# Patient Record
Sex: MALE | Race: WHITE | NOT HISPANIC OR LATINO | Employment: OTHER | URBAN - METROPOLITAN AREA
[De-identification: names, ages, dates, MRNs, and addresses within clinical notes are randomized per-mention and may not be internally consistent; named-entity substitution may affect disease eponyms.]

---

## 2019-02-28 RX ORDER — MONTELUKAST SODIUM 10 MG/1
TABLET ORAL
Qty: 14 TABLET | Refills: 0 | Status: SHIPPED | OUTPATIENT
Start: 2019-02-28 | End: 2019-10-11

## 2019-10-11 ENCOUNTER — OFFICE VISIT (OUTPATIENT)
Dept: FAMILY MEDICINE | Facility: CLINIC | Age: 84
End: 2019-10-11
Payer: MEDICARE

## 2019-10-11 VITALS
OXYGEN SATURATION: 97 % | DIASTOLIC BLOOD PRESSURE: 76 MMHG | BODY MASS INDEX: 29.28 KG/M2 | WEIGHT: 193.19 LBS | HEART RATE: 64 BPM | HEIGHT: 68 IN | SYSTOLIC BLOOD PRESSURE: 122 MMHG | TEMPERATURE: 97 F | RESPIRATION RATE: 18 BRPM

## 2019-10-11 DIAGNOSIS — I25.10 ATHEROSCLEROSIS OF CORONARY ARTERY, ANGINA PRESENCE UNSPECIFIED, UNSPECIFIED VESSEL OR LESION TYPE, UNSPECIFIED WHETHER NATIVE OR TRANSPLANTED HEART: ICD-10-CM

## 2019-10-11 DIAGNOSIS — N18.30 CHRONIC RENAL INSUFFICIENCY, STAGE III (MODERATE): ICD-10-CM

## 2019-10-11 DIAGNOSIS — N32.81 OVERACTIVE BLADDER: ICD-10-CM

## 2019-10-11 DIAGNOSIS — I10 ESSENTIAL HYPERTENSION: ICD-10-CM

## 2019-10-11 DIAGNOSIS — F41.9 ANXIETY: ICD-10-CM

## 2019-10-11 DIAGNOSIS — E11.638 TYPE 2 DIABETES MELLITUS WITH OTHER ORAL COMPLICATION, WITHOUT LONG-TERM CURRENT USE OF INSULIN: Primary | ICD-10-CM

## 2019-10-11 DIAGNOSIS — N39.41 URGE INCONTINENCE: ICD-10-CM

## 2019-10-11 DIAGNOSIS — F51.01 PRIMARY INSOMNIA: ICD-10-CM

## 2019-10-11 DIAGNOSIS — J44.9 CHRONIC OBSTRUCTIVE PULMONARY DISEASE, UNSPECIFIED COPD TYPE: ICD-10-CM

## 2019-10-11 DIAGNOSIS — N40.0 BENIGN PROSTATIC HYPERPLASIA, UNSPECIFIED WHETHER LOWER URINARY TRACT SYMPTOMS PRESENT: ICD-10-CM

## 2019-10-11 DIAGNOSIS — E78.00 PURE HYPERCHOLESTEROLEMIA: ICD-10-CM

## 2019-10-11 PROBLEM — C61 MALIGNANT TUMOR OF PROSTATE: Status: ACTIVE | Noted: 2017-12-13

## 2019-10-11 PROBLEM — Z86.31 PERSONAL HISTORY OF DIABETIC FOOT ULCER: Status: ACTIVE | Noted: 2019-10-11

## 2019-10-11 PROBLEM — I73.9 INTERMITTENT CLAUDICATION: Status: ACTIVE | Noted: 2019-10-11

## 2019-10-11 PROBLEM — I50.1 LEFT HEART FAILURE: Status: ACTIVE | Noted: 2019-10-11

## 2019-10-11 PROBLEM — E11.9 TYPE 2 DIABETES MELLITUS: Status: ACTIVE | Noted: 2019-10-11

## 2019-10-11 PROBLEM — R06.00 DYSPNEA: Status: ACTIVE | Noted: 2019-10-11

## 2019-10-11 PROBLEM — R97.20 RAISED PROSTATE SPECIFIC ANTIGEN: Status: ACTIVE | Noted: 2019-10-11

## 2019-10-11 PROBLEM — R21 RASH: Status: ACTIVE | Noted: 2019-10-11

## 2019-10-11 PROBLEM — D50.0 ANEMIA DUE TO CHRONIC BLOOD LOSS: Status: ACTIVE | Noted: 2019-10-11

## 2019-10-11 PROBLEM — F32.A DEPRESSIVE DISORDER: Status: ACTIVE | Noted: 2019-10-11

## 2019-10-11 PROBLEM — E78.5 HYPERLIPIDEMIA: Status: ACTIVE | Noted: 2019-10-11

## 2019-10-11 PROBLEM — R01.1 HEART MURMUR: Status: ACTIVE | Noted: 2019-10-11

## 2019-10-11 PROCEDURE — 99214 OFFICE O/P EST MOD 30 MIN: CPT | Mod: S$GLB,,, | Performed by: NURSE PRACTITIONER

## 2019-10-11 PROCEDURE — 99214 PR OFFICE/OUTPT VISIT, EST, LEVL IV, 30-39 MIN: ICD-10-PCS | Mod: S$GLB,,, | Performed by: NURSE PRACTITIONER

## 2019-10-11 RX ORDER — ATORVASTATIN CALCIUM 40 MG/1
40 TABLET, FILM COATED ORAL NIGHTLY
COMMUNITY
End: 2020-02-17

## 2019-10-11 RX ORDER — CARVEDILOL 3.12 MG/1
3.12 TABLET ORAL 2 TIMES DAILY WITH MEALS
COMMUNITY
End: 2022-06-09

## 2019-10-11 RX ORDER — OMEPRAZOLE 40 MG/1
40 CAPSULE, DELAYED RELEASE ORAL DAILY
COMMUNITY
End: 2023-04-28

## 2019-10-11 RX ORDER — TAMSULOSIN HYDROCHLORIDE 0.4 MG/1
0.4 CAPSULE ORAL DAILY
COMMUNITY

## 2019-10-11 RX ORDER — LEVOTHYROXINE SODIUM 125 UG/1
TABLET ORAL DAILY
COMMUNITY
End: 2022-11-18

## 2019-10-11 RX ORDER — DONEPEZIL HYDROCHLORIDE 10 MG/1
10 TABLET, FILM COATED ORAL NIGHTLY
COMMUNITY
End: 2019-10-22 | Stop reason: SDUPTHER

## 2019-10-11 RX ORDER — FUROSEMIDE 40 MG/1
40 TABLET ORAL DAILY
COMMUNITY

## 2019-10-11 RX ORDER — MONTELUKAST SODIUM 10 MG/1
10 TABLET ORAL NIGHTLY
Qty: 90 TABLET | Refills: 3
Start: 2019-10-11 | End: 2021-09-28

## 2019-10-11 RX ORDER — ASPIRIN 81 MG/1
81 TABLET ORAL DAILY
COMMUNITY

## 2019-10-11 RX ORDER — FINASTERIDE 5 MG/1
5 TABLET, FILM COATED ORAL DAILY
COMMUNITY

## 2019-10-11 NOTE — PROGRESS NOTES
Subjective:       Patient ID: Mae Hill Sr. is a 86 y.o. male.    Chief Complaint: routine f/u    HPI   Chronic Disease F/U  Reported by patient.    Anxiety compliant with medications; healthier diet, some weekly exercise    Hyperlipidemia well controlled; compliant with medications; no side effects; He claims that he is eating healthy and gets regular exercise    Hypertension well controlled; compliant with medications; no side effects    Hypothyroidism compliant with medications; no side effects    CKD 3 compliant w/ nephrology appts w/ Dr Jones    Diabetes F/U  Reported by patient.  Review finger sticks: morning fasting glucose 130s  Context: seeing eye doctor regularly; checking feet regularly; taking aspirin daily; not missing doses of medications; no side effects from medications  Associated Symptoms: no dizziness; no sweats; no headaches; no increased thirst; no increased appetite; no increased urination; no blurred vision; no calluses on feet; weight loss (13 lbs); no confusion; numbness of feet    Review of Systems   Constitutional: Negative for activity change, appetite change, chills, fatigue and fever.   HENT: Positive for postnasal drip and rhinorrhea.    Respiratory: Negative for apnea, cough, chest tightness, shortness of breath and wheezing.    Cardiovascular: Negative for chest pain, palpitations and leg swelling.   Gastrointestinal: Negative for abdominal distention, abdominal pain, blood in stool, constipation, diarrhea and vomiting.   Genitourinary: Positive for frequency. Negative for difficulty urinating, dysuria, flank pain and urgency.   Musculoskeletal: Negative for arthralgias, back pain, gait problem, joint swelling and neck pain.   Skin: Negative for color change, pallor and rash.   Neurological: Negative for dizziness, tremors, syncope, weakness, light-headedness and numbness.   Hematological: Negative for adenopathy. Does not bruise/bleed easily.   Psychiatric/Behavioral:  Negative for agitation, confusion and sleep disturbance. The patient is not nervous/anxious.        Objective:      Physical Exam   Constitutional: He is oriented to person, place, and time. Vital signs are normal. He appears well-developed and well-nourished.   HENT:   Head: Normocephalic and atraumatic.   Mouth/Throat: Oropharynx is clear and moist.   Eyes: Pupils are equal, round, and reactive to light. Conjunctivae are normal.   Neck: Trachea normal and normal range of motion. Neck supple. Carotid bruit is not present.   Cardiovascular: Normal rate, regular rhythm and normal heart sounds.   Pulmonary/Chest: Effort normal and breath sounds normal.   Abdominal: Soft. Bowel sounds are normal.   Musculoskeletal: Normal range of motion.   Neurological: He is alert and oriented to person, place, and time.   Skin: Skin is warm, dry and intact.   Psychiatric: He has a normal mood and affect. His speech is normal and behavior is normal. Judgment normal.       Assessment:       1. 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 Anxiety    3. Urge incontinence    4. Overactive bladder    5. Benign prostatic hyperplasia, unspecified whether lower urinary tract symptoms present    6. Primary insomnia    7. Chronic renal insufficiency, stage III (moderate)    8. Essential hypertension    9. Pure hypercholesterolemia    10. Chronic obstructive pulmonary disease, unspecified COPD type    11. Atherosclerosis of coronary artery, angina presence unspecified, unspecified vessel or lesion type, unspecified whether native or transplanted heart        Plan:       PROBLEM LIST     Mae was seen today for medication problem.    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    Overactive bladder  -     mirabegron (MYRBETRIQ) 25 mg Tb24 ER tablet; Take 1 tablet (25 mg total) by mouth once daily.    Benign prostatic hyperplasia, unspecified whether lower urinary tract symptoms present    Primary insomnia    Chronic renal insufficiency, stage III (moderate)    Essential hypertension    Pure hypercholesterolemia    Chronic obstructive pulmonary disease, unspecified COPD type    Atherosclerosis of coronary artery, angina presence unspecified, unspecified vessel or lesion type, unspecified whether native or transplanted heart    Other orders  -     montelukast (SINGULAIR) 10 mg tablet; Take 1 tablet (10 mg total) by mouth every evening.    Thirteen lb weight loss and A1c 5.5%; excellent. Feeling better. BP at goal. Continue current regimen.

## 2019-10-11 NOTE — PATIENT INSTRUCTIONS
Start melatonin 10 mg at bedtime. Take the sublingual tablets that you dissolve under your tongue..

## 2019-10-22 DIAGNOSIS — F03.90 DEMENTIA WITHOUT BEHAVIORAL DISTURBANCE, UNSPECIFIED DEMENTIA TYPE: Primary | ICD-10-CM

## 2019-10-22 RX ORDER — DONEPEZIL HYDROCHLORIDE 10 MG/1
10 TABLET, FILM COATED ORAL NIGHTLY
Qty: 90 TABLET | Refills: 3 | Status: SHIPPED | OUTPATIENT
Start: 2019-10-22 | End: 2019-12-04

## 2019-11-13 ENCOUNTER — TELEPHONE (OUTPATIENT)
Dept: UROLOGY | Facility: CLINIC | Age: 84
End: 2019-11-13

## 2019-11-13 NOTE — TELEPHONE ENCOUNTER
----- Message from Shade Cordova sent at 11/12/2019  4:16 PM CST -----  Contact: Pt  Mr. Hill is wondering about how many times he can have his PSA level checked. Please call them at [phone] and please leave a voicemail.

## 2019-11-15 ENCOUNTER — OFFICE VISIT (OUTPATIENT)
Dept: UROLOGY | Facility: CLINIC | Age: 84
End: 2019-11-15
Payer: MEDICARE

## 2019-11-15 VITALS
WEIGHT: 193 LBS | DIASTOLIC BLOOD PRESSURE: 62 MMHG | SYSTOLIC BLOOD PRESSURE: 110 MMHG | HEIGHT: 68 IN | RESPIRATION RATE: 18 BRPM | HEART RATE: 64 BPM | BODY MASS INDEX: 29.25 KG/M2

## 2019-11-15 DIAGNOSIS — C61 MALIGNANT TUMOR OF PROSTATE: Primary | ICD-10-CM

## 2019-11-15 DIAGNOSIS — N40.0 BENIGN PROSTATIC HYPERPLASIA, UNSPECIFIED WHETHER LOWER URINARY TRACT SYMPTOMS PRESENT: ICD-10-CM

## 2019-11-15 LAB
BILIRUB UR QL STRIP: POSITIVE
GLUCOSE UR QL STRIP: NEGATIVE
KETONES UR QL STRIP: POSITIVE
LEUKOCYTE ESTERASE UR QL STRIP: NEGATIVE
PH, POC UA: 5.5
POC AMORP, URINE: ABNORMAL
POC BACTI, URINE: ABNORMAL
POC BLOOD, URINE: NEGATIVE
POC CASTS, URINE: ABNORMAL
POC CRYST, URINE: ABNORMAL
POC EPITH, URINE: ABNORMAL
POC HCG, URINE: ABNORMAL
POC HYALIN, URINE: ABNORMAL
POC MUCUS, URINE: ABNORMAL
POC NITRATES, URINE: NEGATIVE
POC OTHER, URINE: ABNORMAL
POC RBC, URINE: ABNORMAL
POC RESIDUAL URINE VOLUME: 0 ML (ref 0–100)
POC WBC, URINE: ABNORMAL
PROT UR QL STRIP: POSITIVE
PSA, DIAGNOSTIC: 1.89 NG/ML (ref 0.1–4)
SP GR UR STRIP: 1.02 (ref 1–1.03)
UROBILINOGEN UR STRIP-ACNC: 1 (ref 0.3–2.2)

## 2019-11-15 PROCEDURE — 99213 OFFICE O/P EST LOW 20 MIN: CPT | Mod: 25,S$GLB,, | Performed by: NURSE PRACTITIONER

## 2019-11-15 PROCEDURE — 51798 US URINE CAPACITY MEASURE: CPT | Mod: S$GLB,,, | Performed by: NURSE PRACTITIONER

## 2019-11-15 PROCEDURE — 81001 POCT URINALYSIS (W/MICRO OPTION): ICD-10-PCS | Mod: S$GLB,,, | Performed by: NURSE PRACTITIONER

## 2019-11-15 PROCEDURE — 51798 POCT BLADDER SCAN: ICD-10-PCS | Mod: S$GLB,,, | Performed by: NURSE PRACTITIONER

## 2019-11-15 PROCEDURE — 99213 PR OFFICE/OUTPT VISIT, EST, LEVL III, 20-29 MIN: ICD-10-PCS | Mod: 25,S$GLB,, | Performed by: NURSE PRACTITIONER

## 2019-11-15 PROCEDURE — 81001 URINALYSIS AUTO W/SCOPE: CPT | Mod: S$GLB,,, | Performed by: NURSE PRACTITIONER

## 2019-11-15 NOTE — ASSESSMENT & PLAN NOTE
Doing well on tamsulosin/finasteride, adequate medication on hand with refill.  PSA negative for blood nitrite leukocyte.  PVR 0

## 2019-11-15 NOTE — PROGRESS NOTES
Subjective:       Patient ID: Mae Hill Sr. is a 86 y.o. male.    Chief Complaint: Other (3 mth F/U prostate cacner, and difficulty voiding, He states he has had left lower abdomen pain x 3 days) and Urinary Frequency      HPI: 86-year-old male patient known to Dr. Mcfadden today.  History of cancer of the prostate on observation.  Current PSA 1.08 September 13, 2019.  Patient is off Casodex.  Denies any bone pain unexplained weight loss.  Also has BPH, on finasteride/tamsulosin.  Voiding without difficulty no blood dysuria frequency urgency incontinence.      Past Medical History: History reviewed. No pertinent past medical history.    Past Surgical Historical:   Past Surgical History:   Procedure Laterality Date    HERNIA REPAIR          Medications:   Medication List with Changes/Refills   Current Medications    ASPIRIN (ECOTRIN) 81 MG EC TABLET    Take 81 mg by mouth once daily.    ATORVASTATIN (LIPITOR) 40 MG TABLET    Take 40 mg by mouth every evening.    CARVEDILOL (COREG) 3.125 MG TABLET    Take 3.125 mg by mouth 2 (two) times daily with meals.    DONEPEZIL (ARICEPT) 10 MG TABLET    Take 1 tablet (10 mg total) by mouth every evening.    FINASTERIDE (PROSCAR) 5 MG TABLET    Take 5 mg by mouth once daily.    FUROSEMIDE (LASIX) 40 MG TABLET    Take 40 mg by mouth once daily.    LEVOTHYROXINE (SYNTHROID) 88 MCG TABLET    Take 88 mcg by mouth once daily.    MIRABEGRON (MYRBETRIQ) 25 MG TB24 ER TABLET    Take 1 tablet (25 mg total) by mouth once daily.    MONTELUKAST (SINGULAIR) 10 MG TABLET    Take 1 tablet (10 mg total) by mouth every evening.    OMEPRAZOLE (PRILOSEC) 40 MG CAPSULE    Take 40 mg by mouth once daily.    TAMSULOSIN (FLOMAX) 0.4 MG CAP    Take 0.4 mg by mouth once daily.        Past Social History:   Social History     Socioeconomic History    Marital status:      Spouse name: Not on file    Number of children: Not on file    Years of education: Not on file    Highest education  level: Not on file   Occupational History    Not on file   Social Needs    Financial resource strain: Not on file    Food insecurity:     Worry: Not on file     Inability: Not on file    Transportation needs:     Medical: Not on file     Non-medical: Not on file   Tobacco Use    Smoking status: Never Smoker    Smokeless tobacco: Never Used   Substance and Sexual Activity    Alcohol use: Not Currently    Drug use: Not on file    Sexual activity: Not on file   Lifestyle    Physical activity:     Days per week: Not on file     Minutes per session: Not on file    Stress: Not at all   Relationships    Social connections:     Talks on phone: Not on file     Gets together: Not on file     Attends Gnosticist service: Not on file     Active member of club or organization: Not on file     Attends meetings of clubs or organizations: Not on file     Relationship status: Not on file   Other Topics Concern    Not on file   Social History Narrative    Not on file       Allergies:   Review of patient's allergies indicates:   Allergen Reactions    Codeine Itching and Nausea And Vomiting        Family History:   Family History   Problem Relation Age of Onset    Heart disease Mother     Heart disease Father         Review of Systems:  Review of Systems   Constitutional: Negative for activity change and appetite change.   HENT: Negative for congestion and dental problem.    Respiratory: Negative for chest tightness and shortness of breath.    Cardiovascular: Negative for chest pain.   Gastrointestinal: Negative for abdominal distention and abdominal pain.   Genitourinary: Negative for decreased urine volume, difficulty urinating, discharge, dysuria, enuresis, flank pain, frequency, genital sores, hematuria, penile pain, penile swelling, scrotal swelling, testicular pain and urgency.   Musculoskeletal: Negative for back pain and neck pain.   Neurological: Negative for dizziness.   Hematological: Negative for adenopathy.    Psychiatric/Behavioral: Negative for agitation, behavioral problems and confusion.       Physical Exam:  Physical Exam   Constitutional: He is oriented to person, place, and time. He appears well-developed and well-nourished.   HENT:   Head: Normocephalic.   Eyes: No scleral icterus.   Neck: Normal range of motion.   Cardiovascular: Intact distal pulses.    Pulmonary/Chest: Effort normal and breath sounds normal.   Abdominal: Soft. He exhibits no distension. There is no tenderness. No hernia. Hernia confirmed negative in the right inguinal area and confirmed negative in the left inguinal area.   Neurological: He is alert and oriented to person, place, and time.   Skin: Skin is warm and dry.     Psychiatric: He has a normal mood and affect.       Assessment/Plan:       Problem List Items Addressed This Visit        Renal/    Benign prostatic hyperplasia    Current Assessment & Plan     Doing well on tamsulosin/finasteride, adequate medication on hand with refill            Oncology    Malignant tumor of prostate    Current Assessment & Plan     Off Casodex, on observation.  PSA 1.08 September 13, 2019.  Recheck PSA today           Other Visit Diagnoses     Prostate cancer    -  Primary

## 2019-12-09 ENCOUNTER — TELEPHONE (OUTPATIENT)
Dept: FAMILY MEDICINE | Facility: CLINIC | Age: 84
End: 2019-12-09

## 2019-12-09 PROBLEM — F01.50 VASCULAR DEMENTIA: Status: ACTIVE | Noted: 2019-12-09

## 2019-12-11 ENCOUNTER — TELEPHONE (OUTPATIENT)
Dept: FAMILY MEDICINE | Facility: CLINIC | Age: 84
End: 2019-12-11

## 2020-01-09 ENCOUNTER — TELEPHONE (OUTPATIENT)
Dept: FAMILY MEDICINE | Facility: CLINIC | Age: 85
End: 2020-01-09

## 2020-02-17 RX ORDER — ATORVASTATIN CALCIUM 40 MG/1
TABLET, FILM COATED ORAL
Qty: 90 TABLET | Refills: 1 | Status: SHIPPED | OUTPATIENT
Start: 2020-02-17

## 2020-02-21 ENCOUNTER — OFFICE VISIT (OUTPATIENT)
Dept: UROLOGY | Facility: CLINIC | Age: 85
End: 2020-02-21
Payer: MEDICARE

## 2020-02-21 VITALS
WEIGHT: 206 LBS | SYSTOLIC BLOOD PRESSURE: 115 MMHG | BODY MASS INDEX: 30.51 KG/M2 | DIASTOLIC BLOOD PRESSURE: 75 MMHG | HEIGHT: 69 IN | HEART RATE: 74 BPM

## 2020-02-21 DIAGNOSIS — C61 PROSTATE CANCER: Primary | ICD-10-CM

## 2020-02-21 LAB
BILIRUB UR QL STRIP: NEGATIVE
GLUCOSE UR QL STRIP: NEGATIVE
KETONES UR QL STRIP: NEGATIVE
LEUKOCYTE ESTERASE UR QL STRIP: NEGATIVE
PH, POC UA: 5
POC AMORP, URINE: ABNORMAL
POC BACTI, URINE: ABNORMAL
POC BLOOD, URINE: NEGATIVE
POC CASTS, URINE: ABNORMAL
POC CRYST, URINE: ABNORMAL
POC EPITH, URINE: ABNORMAL
POC HCG, URINE: ABNORMAL
POC HYALIN, URINE: ABNORMAL LPF
POC MUCUS, URINE: ABNORMAL
POC NITRATES, URINE: NEGATIVE
POC OTHER, URINE: ABNORMAL
POC RBC, URINE: ABNORMAL HPF
POC WBC, URINE: ABNORMAL HPF
PROT UR QL STRIP: NEGATIVE
PSA, DIAGNOSTIC: 2.29 NG/ML (ref 0–4)
SP GR UR STRIP: 1.01 (ref 1–1.03)
UROBILINOGEN UR STRIP-ACNC: 0.2 (ref 0.3–2.2)

## 2020-02-21 PROCEDURE — 99214 PR OFFICE/OUTPT VISIT, EST, LEVL IV, 30-39 MIN: ICD-10-PCS | Mod: S$GLB,,, | Performed by: UROLOGY

## 2020-02-21 PROCEDURE — 99214 OFFICE O/P EST MOD 30 MIN: CPT | Mod: S$GLB,,, | Performed by: UROLOGY

## 2020-02-21 NOTE — PROGRESS NOTES
Subjective:       Patient ID: Mae Hill Sr. is a 87 y.o. male.    Chief Complaint: Other (3 month fu )      HPI: 86 yo male with prostate cancer presently is not on casodex.  Also difficulty voiding on flomax and proscar.  States urinating well       Past Medical History:   Past Medical History:   Diagnosis Date    High cholesterol     History of BPH     Hypertension        Past Surgical Historical:   Past Surgical History:   Procedure Laterality Date    COMBINED RIGHT AND TRANSSEPTAL (EXISTING OPENING) LEFT HEART CATHETERIZATION      CYSTOSCOPY      HERNIA REPAIR          Medications:   Medication List with Changes/Refills   Current Medications    ASPIRIN (ECOTRIN) 81 MG EC TABLET    Take 81 mg by mouth once daily.    ATORVASTATIN (LIPITOR) 40 MG TABLET    TAKE 1 TABLET BY MOUTH ONCE DAILY AT BEDTIME    CARVEDILOL (COREG) 3.125 MG TABLET    Take 3.125 mg by mouth 2 (two) times daily with meals.    FINASTERIDE (PROSCAR) 5 MG TABLET    Take 5 mg by mouth once daily.    FUROSEMIDE (LASIX) 40 MG TABLET    Take 40 mg by mouth once daily.    LEVOTHYROXINE (SYNTHROID) 88 MCG TABLET    Take 88 mcg by mouth once daily.    MIRABEGRON (MYRBETRIQ) 25 MG TB24 ER TABLET    Take 1 tablet (25 mg total) by mouth once daily.    MONTELUKAST (SINGULAIR) 10 MG TABLET    Take 1 tablet (10 mg total) by mouth every evening.    OMEPRAZOLE (PRILOSEC) 40 MG CAPSULE    Take 40 mg by mouth once daily.    TAMSULOSIN (FLOMAX) 0.4 MG CAP    Take 0.4 mg by mouth once daily.        Past Social History:   Social History     Socioeconomic History    Marital status:      Spouse name: Not on file    Number of children: Not on file    Years of education: Not on file    Highest education level: Not on file   Occupational History    Not on file   Social Needs    Financial resource strain: Not on file    Food insecurity:     Worry: Not on file     Inability: Not on file    Transportation needs:     Medical: Not on file      Non-medical: Not on file   Tobacco Use    Smoking status: Never Smoker    Smokeless tobacco: Never Used   Substance and Sexual Activity    Alcohol use: Not Currently    Drug use: Never    Sexual activity: Not on file   Lifestyle    Physical activity:     Days per week: Not on file     Minutes per session: Not on file    Stress: Not at all   Relationships    Social connections:     Talks on phone: Not on file     Gets together: Not on file     Attends Adventist service: Not on file     Active member of club or organization: Not on file     Attends meetings of clubs or organizations: Not on file     Relationship status: Not on file   Other Topics Concern    Not on file   Social History Narrative    Not on file       Allergies:   Review of patient's allergies indicates:   Allergen Reactions    Codeine Itching and Nausea And Vomiting        Family History:   Family History   Problem Relation Age of Onset    Heart disease Mother     Heart disease Father         Review of Systems:  Review of Systems   Constitutional: Negative for activity change and appetite change.   HENT: Negative for congestion and dental problem.    Eyes: Negative for visual disturbance.   Respiratory: Negative for chest tightness and shortness of breath.    Cardiovascular: Negative for chest pain.   Gastrointestinal: Negative for abdominal distention and abdominal pain.   Genitourinary: Negative for decreased urine volume, difficulty urinating, discharge, dysuria, enuresis, flank pain, frequency, genital sores, hematuria, penile pain, penile swelling, scrotal swelling, testicular pain and urgency.   Musculoskeletal: Negative for back pain and neck pain.   Skin: Negative for color change.   Neurological: Negative for dizziness.   Hematological: Negative for adenopathy.   Psychiatric/Behavioral: Negative for agitation, behavioral problems and confusion.       Physical Exam:  Physical Exam   Nursing note and vitals  reviewed.  Constitutional: He is oriented to person, place, and time. He appears well-developed and well-nourished.   HENT:   Head: Normocephalic.   Eyes: Pupils are equal, round, and reactive to light.   Neck: Normal range of motion. Neck supple.   Cardiovascular: Normal rate, regular rhythm and normal heart sounds.    Pulmonary/Chest: Effort normal and breath sounds normal.   Abdominal: Soft. Bowel sounds are normal.   Genitourinary: Rectum normal, prostate normal and penis normal.   Musculoskeletal: Normal range of motion.   Neurological: He is alert and oriented to person, place, and time.   Skin: Skin is warm and dry.     Psychiatric: He has a normal mood and affect. His behavior is normal.   ua is normal    Assessment/Plan:     prostate cancer--will check psa and notify patient--will also inform patient as to whether or not he needs to resume casodex    Difficulty voiding doing well on present meds--he will let us kniow when he needs refills  Problem List Items Addressed This Visit     None

## 2020-03-30 RX ORDER — CARVEDILOL 3.12 MG/1
TABLET ORAL
Refills: 0 | OUTPATIENT
Start: 2020-03-30

## 2020-04-02 RX ORDER — CARVEDILOL 3.12 MG/1
3.12 TABLET ORAL 2 TIMES DAILY WITH MEALS
Qty: 90 TABLET | Refills: 3 | OUTPATIENT
Start: 2020-04-02

## 2020-05-22 ENCOUNTER — OFFICE VISIT (OUTPATIENT)
Dept: UROLOGY | Facility: CLINIC | Age: 85
End: 2020-05-22
Payer: MEDICARE

## 2020-05-22 VITALS
RESPIRATION RATE: 18 BRPM | BODY MASS INDEX: 29.62 KG/M2 | WEIGHT: 200 LBS | SYSTOLIC BLOOD PRESSURE: 132 MMHG | HEART RATE: 65 BPM | HEIGHT: 69 IN | DIASTOLIC BLOOD PRESSURE: 62 MMHG

## 2020-05-22 DIAGNOSIS — C61 PROSTATE CANCER: Primary | ICD-10-CM

## 2020-05-22 LAB
BILIRUB UR QL STRIP: POSITIVE
GLUCOSE UR QL STRIP: NEGATIVE
KETONES UR QL STRIP: POSITIVE
LEUKOCYTE ESTERASE UR QL STRIP: NEGATIVE
PH, POC UA: 5.5
POC AMORP, URINE: ABNORMAL
POC BACTI, URINE: ABNORMAL
POC BLOOD, URINE: NEGATIVE
POC CASTS, URINE: ABNORMAL
POC CRYST, URINE: ABNORMAL
POC EPITH, URINE: ABNORMAL
POC HCG, URINE: ABNORMAL
POC HYALIN, URINE: ABNORMAL LPF
POC MUCUS, URINE: ABNORMAL
POC NITRATES, URINE: NEGATIVE
POC OTHER, URINE: ABNORMAL
POC RBC, URINE: ABNORMAL HPF
POC WBC, URINE: ABNORMAL HPF
PROT UR QL STRIP: NEGATIVE
PSA, DIAGNOSTIC: 4.62 NG/ML (ref 0–4)
SP GR UR STRIP: 1.02 (ref 1–1.03)
UROBILINOGEN UR STRIP-ACNC: 0.2 (ref 0.3–2.2)

## 2020-05-22 PROCEDURE — 99213 OFFICE O/P EST LOW 20 MIN: CPT | Mod: S$GLB,,, | Performed by: UROLOGY

## 2020-05-22 PROCEDURE — 99213 PR OFFICE/OUTPT VISIT, EST, LEVL III, 20-29 MIN: ICD-10-PCS | Mod: S$GLB,,, | Performed by: UROLOGY

## 2020-05-22 NOTE — PROGRESS NOTES
Subjective:       Patient ID: Mae Hill Sr. is a 87 y.o. male.    Chief Complaint: 3 mth f/u      HPI: 87-year-old male, patient Dr. Mcfadden, presents for 3 month re-evaluation.  Patient has a history of prostate cancer.  Patient on observation.  Patient has been on Casodex in the past.  Patient at this time is not on Casodex.  Last biopsy was in 2017.  One out of the 12 biopsies was positive and a Mikey 8.  Two of the 12 biopsies were ASAP.  Last biopsy on 02/21/2020 was 2.29.  Prior to that on 11/15/2019 PSA was 1.89.    Patient denies any pain or burning urination.  Denies any difficulty voiding.  States he has a good stream.  Denies blood in his urine.  Denies any significant weight loss.  Denies bone pain.  No other urinary complaints.  All other health problems appear stable at this time.       Past Medical History:   Past Medical History:   Diagnosis Date    Anxiety     Chronic renal insufficiency, stage III (moderate)     COPD (chronic obstructive pulmonary disease)     Depression     Heart failure     L    High cholesterol     History of BPH     Hypertension     Prostate CA     Vascular dementia        Past Surgical Historical:   Past Surgical History:   Procedure Laterality Date    COMBINED RIGHT AND TRANSSEPTAL (EXISTING OPENING) LEFT HEART CATHETERIZATION      CYSTOSCOPY      HERNIA REPAIR          Medications:   Medication List with Changes/Refills   Current Medications    ASPIRIN (ECOTRIN) 81 MG EC TABLET    Take 81 mg by mouth once daily.    ATORVASTATIN (LIPITOR) 40 MG TABLET    TAKE 1 TABLET BY MOUTH ONCE DAILY AT BEDTIME    CARVEDILOL (COREG) 3.125 MG TABLET    Take 3.125 mg by mouth 2 (two) times daily with meals.    FINASTERIDE (PROSCAR) 5 MG TABLET    Take 5 mg by mouth once daily.    FUROSEMIDE (LASIX) 40 MG TABLET    Take 40 mg by mouth once daily.    LEVOTHYROXINE (SYNTHROID) 88 MCG TABLET    Take 88 mcg by mouth once daily.    MIRABEGRON (MYRBETRIQ) 25 MG TB24 ER  TABLET    Take 1 tablet (25 mg total) by mouth once daily.    MONTELUKAST (SINGULAIR) 10 MG TABLET    Take 1 tablet (10 mg total) by mouth every evening.    OMEPRAZOLE (PRILOSEC) 40 MG CAPSULE    Take 40 mg by mouth once daily.    TAMSULOSIN (FLOMAX) 0.4 MG CAP    Take 0.4 mg by mouth once daily.        Past Social History:   Social History     Socioeconomic History    Marital status:      Spouse name: Not on file    Number of children: Not on file    Years of education: Not on file    Highest education level: Not on file   Occupational History    Not on file   Social Needs    Financial resource strain: Not on file    Food insecurity:     Worry: Not on file     Inability: Not on file    Transportation needs:     Medical: Not on file     Non-medical: Not on file   Tobacco Use    Smoking status: Never Smoker    Smokeless tobacco: Never Used   Substance and Sexual Activity    Alcohol use: Not Currently    Drug use: Never    Sexual activity: Not on file   Lifestyle    Physical activity:     Days per week: Not on file     Minutes per session: Not on file    Stress: Not at all   Relationships    Social connections:     Talks on phone: Not on file     Gets together: Not on file     Attends Mormon service: Not on file     Active member of club or organization: Not on file     Attends meetings of clubs or organizations: Not on file     Relationship status: Not on file   Other Topics Concern    Not on file   Social History Narrative    Not on file       Allergies:   Review of patient's allergies indicates:   Allergen Reactions    Codeine Itching and Nausea And Vomiting        Family History:   Family History   Problem Relation Age of Onset    Heart disease Mother     Heart disease Father         Review of Systems:  Review of Systems   Constitutional: Negative for activity change and appetite change.   HENT: Negative for congestion and dental problem.    Respiratory: Negative for chest tightness  and shortness of breath.    Cardiovascular: Negative for chest pain.   Gastrointestinal: Negative for abdominal distention and abdominal pain.   Genitourinary: Negative for decreased urine volume, difficulty urinating, discharge, dysuria, enuresis, flank pain, frequency, genital sores, hematuria, penile pain, penile swelling, scrotal swelling, testicular pain and urgency.   Musculoskeletal: Negative for back pain and neck pain.   Neurological: Negative for dizziness.   Hematological: Negative for adenopathy.   Psychiatric/Behavioral: Negative for agitation, behavioral problems and confusion.       Physical Exam:  Physical Exam   Nursing note and vitals reviewed.  Constitutional: He is oriented to person, place, and time. He appears well-developed and well-nourished.   HENT:   Head: Normocephalic.   Cardiovascular: Normal rate, regular rhythm and normal heart sounds.    Pulmonary/Chest: Effort normal and breath sounds normal.   Abdominal: Soft. Bowel sounds are normal.   Neurological: He is alert and oriented to person, place, and time.   Skin: Skin is warm and dry.      Urinalysis:  Small bilirubin, trace ketones, otherwise normal.    Assessment/Plan:   1.  Prostate cancer:  We can check the patient's PSA.  Will notify him of the results.  We will notify him he needs to restart his Casodex.    Patient will follow up in 3-4 months, sooner if needed.  Problem List Items Addressed This Visit     None      Visit Diagnoses     Prostate cancer    -  Primary    Relevant Orders    Prostate Specific Antigen, Diagnostic

## 2020-05-22 NOTE — PROGRESS NOTES
Pt seen chart reviewed  discussed with Carlos Eduardo.  Will check psa and notify pt of results. Pt has had 8 biopsies only the last one showed any cancer.  First seven done by another physician

## 2020-05-25 RX ORDER — SULFAMETHOXAZOLE AND TRIMETHOPRIM 800; 160 MG/1; MG/1
1 TABLET ORAL 2 TIMES DAILY
Qty: 28 TABLET | Refills: 0 | Status: SHIPPED | OUTPATIENT
Start: 2020-05-25 | End: 2020-06-08

## 2020-07-01 ENCOUNTER — OFFICE VISIT (OUTPATIENT)
Dept: UROLOGY | Facility: CLINIC | Age: 85
End: 2020-07-01
Payer: MEDICARE

## 2020-07-01 VITALS
DIASTOLIC BLOOD PRESSURE: 78 MMHG | WEIGHT: 200 LBS | HEIGHT: 69 IN | SYSTOLIC BLOOD PRESSURE: 130 MMHG | RESPIRATION RATE: 18 BRPM | HEART RATE: 80 BPM | BODY MASS INDEX: 29.62 KG/M2

## 2020-07-01 DIAGNOSIS — C61 PROSTATE CANCER: Primary | ICD-10-CM

## 2020-07-01 LAB — PSA, DIAGNOSTIC: 5.01 NG/ML (ref 0–4)

## 2020-07-01 PROCEDURE — 99213 OFFICE O/P EST LOW 20 MIN: CPT | Mod: S$GLB,,, | Performed by: UROLOGY

## 2020-07-01 PROCEDURE — 99213 PR OFFICE/OUTPT VISIT, EST, LEVL III, 20-29 MIN: ICD-10-PCS | Mod: S$GLB,,, | Performed by: UROLOGY

## 2020-07-01 NOTE — PROGRESS NOTES
Subjective:       Patient ID: Mae Hill Sr. is a 87 y.o. male.    Chief Complaint: Prostate Cancer ( F/U )      HPI: 86 yo fu prostate cancer on observation.  Pt has been on casodex in past.  Last visit had psa rise sp antibiotic here to have psa repeated.  Pt has no voiding complaints.       Past Medical History:   Past Medical History:   Diagnosis Date    Anxiety     Chronic renal insufficiency, stage III (moderate)     COPD (chronic obstructive pulmonary disease)     Depression     Heart failure     L    High cholesterol     History of BPH     Hypertension     Prostate CA     Vascular dementia        Past Surgical Historical:   Past Surgical History:   Procedure Laterality Date    COMBINED RIGHT AND TRANSSEPTAL (EXISTING OPENING) LEFT HEART CATHETERIZATION      CYSTOSCOPY      HERNIA REPAIR          Medications:   Medication List with Changes/Refills   Current Medications    ASPIRIN (ECOTRIN) 81 MG EC TABLET    Take 81 mg by mouth once daily.    ATORVASTATIN (LIPITOR) 40 MG TABLET    TAKE 1 TABLET BY MOUTH ONCE DAILY AT BEDTIME    CARVEDILOL (COREG) 3.125 MG TABLET    Take 3.125 mg by mouth 2 (two) times daily with meals.    FINASTERIDE (PROSCAR) 5 MG TABLET    Take 5 mg by mouth once daily.    FUROSEMIDE (LASIX) 40 MG TABLET    Take 40 mg by mouth once daily.    LEVOTHYROXINE (SYNTHROID) 88 MCG TABLET    Take 88 mcg by mouth once daily.    MIRABEGRON (MYRBETRIQ) 25 MG TB24 ER TABLET    Take 1 tablet (25 mg total) by mouth once daily.    MONTELUKAST (SINGULAIR) 10 MG TABLET    Take 1 tablet (10 mg total) by mouth every evening.    OMEPRAZOLE (PRILOSEC) 40 MG CAPSULE    Take 40 mg by mouth once daily.    TAMSULOSIN (FLOMAX) 0.4 MG CAP    Take 0.4 mg by mouth once daily.        Past Social History:   Social History     Socioeconomic History    Marital status:      Spouse name: Not on file    Number of children: Not on file    Years of education: Not on file    Highest education level:  Not on file   Occupational History    Not on file   Social Needs    Financial resource strain: Not on file    Food insecurity     Worry: Not on file     Inability: Not on file    Transportation needs     Medical: Not on file     Non-medical: Not on file   Tobacco Use    Smoking status: Never Smoker    Smokeless tobacco: Never Used   Substance and Sexual Activity    Alcohol use: Not Currently    Drug use: Never    Sexual activity: Not on file   Lifestyle    Physical activity     Days per week: Not on file     Minutes per session: Not on file    Stress: Not at all   Relationships    Social connections     Talks on phone: Not on file     Gets together: Not on file     Attends [employer] service: Not on file     Active member of club or organization: Not on file     Attends meetings of clubs or organizations: Not on file     Relationship status: Not on file   Other Topics Concern    Not on file   Social History Narrative    Not on file       Allergies:   Review of patient's allergies indicates:   Allergen Reactions    Codeine Itching and Nausea And Vomiting        Family History:   Family History   Problem Relation Age of Onset    Heart disease Mother     Heart disease Father         Review of Systems:  Review of Systems   Constitutional: Negative for activity change and appetite change.   HENT: Negative for congestion and dental problem.    Respiratory: Negative for chest tightness and shortness of breath.    Cardiovascular: Negative for chest pain.   Gastrointestinal: Negative for abdominal distention and abdominal pain.   Genitourinary: Negative for decreased urine volume, difficulty urinating, discharge, dysuria, enuresis, flank pain, frequency, genital sores, hematuria, penile pain, penile swelling, scrotal swelling, testicular pain and urgency.   Musculoskeletal: Negative for back pain and neck pain.   Neurological: Negative for dizziness.   Hematological: Negative for adenopathy.    Psychiatric/Behavioral: Negative for agitation, behavioral problems and confusion.       Physical Exam:  Physical Exam   Nursing note and vitals reviewed.  Constitutional: He is oriented to person, place, and time. He appears well-developed.   HENT:   Head: Normocephalic.   Cardiovascular: Normal rate, regular rhythm and normal heart sounds.    Pulmonary/Chest: Effort normal and breath sounds normal.   Abdominal: Soft. Bowel sounds are normal.   Neurological: He is alert and oriented to person, place, and time.   Skin: Skin is warm and dry.         Assessment/Plan:     prostate cancer sp antibiotics will recheck psa notify patient tentatively fu in three months.  May consider restarting casodex  Problem List Items Addressed This Visit     None      Visit Diagnoses     Prostate cancer    -  Primary    Relevant Orders    Prostate Specific Antigen, Diagnostic

## 2020-07-06 RX ORDER — BICALUTAMIDE 50 MG/1
50 TABLET, FILM COATED ORAL DAILY
Qty: 30 TABLET | Refills: 3 | Status: SHIPPED | OUTPATIENT
Start: 2020-07-06 | End: 2021-07-06

## 2020-12-11 ENCOUNTER — OFFICE VISIT (OUTPATIENT)
Dept: UROLOGY | Facility: CLINIC | Age: 85
End: 2020-12-11
Payer: MEDICARE

## 2020-12-11 VITALS
SYSTOLIC BLOOD PRESSURE: 117 MMHG | HEART RATE: 65 BPM | BODY MASS INDEX: 29.62 KG/M2 | WEIGHT: 200 LBS | DIASTOLIC BLOOD PRESSURE: 57 MMHG | RESPIRATION RATE: 18 BRPM | HEIGHT: 69 IN

## 2020-12-11 DIAGNOSIS — C61 PROSTATE CANCER: Primary | ICD-10-CM

## 2020-12-11 LAB — PSA, DIAGNOSTIC: 0.2 NG/ML (ref 0–4)

## 2020-12-11 PROCEDURE — 99213 OFFICE O/P EST LOW 20 MIN: CPT | Mod: S$GLB,,, | Performed by: NURSE PRACTITIONER

## 2020-12-11 PROCEDURE — 99213 PR OFFICE/OUTPT VISIT, EST, LEVL III, 20-29 MIN: ICD-10-PCS | Mod: S$GLB,,, | Performed by: NURSE PRACTITIONER

## 2020-12-11 NOTE — PROGRESS NOTES
Subjective:       Patient ID: Mae Hill Sr. is a 87 y.o. male.    Chief Complaint: 3 mth f/u and Prostate Cancer      HPI: 87-year-old male, patient Dr. Mcfadden, presents for 3 month evaluation.  Patient has a history of prostate cancer.  Patient is on observation.  Patient currently on Casodex.  Patient has been off and on Casodex.  Patient was taken off Casodex at 1 time due to breast tenderness.  Patient's PSA on 07/13/2019 was 1.08.  Increased to 2.29 on 02/21/2020.  05/22/2020 went up more to 4.62.  And on 07/01/2020 PSA is 5.01.  At that time patient was put back on Casodex.  Patient presents today stating he does have some mild breast tenderness.  However patient states it is not bothersome and he can deal with the.  Patient denies any pain or burning urination.  Denies any difficulty voiding.  Denies any blood in urine.  States he has a good stream.  Denies any significant weight loss.  Denies any bone pain.  No other urinary complaints.  All other health problems appear stable at this time.       Past Medical History:   Past Medical History:   Diagnosis Date    Anxiety     Chronic renal insufficiency, stage III (moderate)     COPD (chronic obstructive pulmonary disease)     Depression     Heart failure     L    High cholesterol     History of BPH     Hypertension     Prostate CA     Vascular dementia        Past Surgical Historical:   Past Surgical History:   Procedure Laterality Date    COMBINED RIGHT AND TRANSSEPTAL (EXISTING OPENING) LEFT HEART CATHETERIZATION      CYSTOSCOPY      HERNIA REPAIR          Medications:   Medication List with Changes/Refills   Current Medications    ASPIRIN (ECOTRIN) 81 MG EC TABLET    Take 81 mg by mouth once daily.    ATORVASTATIN (LIPITOR) 40 MG TABLET    TAKE 1 TABLET BY MOUTH ONCE DAILY AT BEDTIME    BICALUTAMIDE (CASODEX) 50 MG TAB    Take 1 tablet (50 mg total) by mouth once daily.    CARVEDILOL (COREG) 3.125 MG TABLET    Take 3.125 mg by mouth 2  (two) times daily with meals.    FINASTERIDE (PROSCAR) 5 MG TABLET    Take 5 mg by mouth once daily.    FUROSEMIDE (LASIX) 40 MG TABLET    Take 40 mg by mouth once daily.    LEVOTHYROXINE (SYNTHROID) 88 MCG TABLET    Take 88 mcg by mouth once daily.    MIRABEGRON (MYRBETRIQ) 25 MG TB24 ER TABLET    Take 1 tablet (25 mg total) by mouth once daily.    MONTELUKAST (SINGULAIR) 10 MG TABLET    Take 1 tablet (10 mg total) by mouth every evening.    OMEPRAZOLE (PRILOSEC) 40 MG CAPSULE    Take 40 mg by mouth once daily.    TAMSULOSIN (FLOMAX) 0.4 MG CAP    Take 0.4 mg by mouth once daily.        Past Social History:   Social History     Socioeconomic History    Marital status:      Spouse name: Not on file    Number of children: Not on file    Years of education: Not on file    Highest education level: Not on file   Occupational History    Not on file   Social Needs    Financial resource strain: Not on file    Food insecurity     Worry: Not on file     Inability: Not on file    Transportation needs     Medical: Not on file     Non-medical: Not on file   Tobacco Use    Smoking status: Never Smoker    Smokeless tobacco: Never Used   Substance and Sexual Activity    Alcohol use: Not Currently    Drug use: Never    Sexual activity: Not on file   Lifestyle    Physical activity     Days per week: Not on file     Minutes per session: Not on file    Stress: Not at all   Relationships    Social connections     Talks on phone: Not on file     Gets together: Not on file     Attends An[employer] service: Not on file     Active member of club or organization: Not on file     Attends meetings of clubs or organizations: Not on file     Relationship status: Not on file   Other Topics Concern    Not on file   Social History Narrative    Not on file       Allergies:   Review of patient's allergies indicates:   Allergen Reactions    Codeine Itching and Nausea And Vomiting        Family History:   Family History    Problem Relation Age of Onset    Heart disease Mother     Heart disease Father         Review of Systems:  Review of Systems   Constitutional: Negative for activity change and appetite change.   HENT: Negative for congestion and dental problem.    Respiratory: Negative for chest tightness and shortness of breath.    Cardiovascular: Negative for chest pain.   Gastrointestinal: Negative for abdominal distention and abdominal pain.   Genitourinary: Negative for decreased urine volume, difficulty urinating, discharge, dysuria, enuresis, flank pain, frequency, genital sores, hematuria, penile pain, penile swelling, scrotal swelling, testicular pain and urgency.   Musculoskeletal: Negative for back pain and neck pain.   Neurological: Negative for dizziness.   Hematological: Negative for adenopathy.   Psychiatric/Behavioral: Negative for agitation, behavioral problems and confusion.       Physical Exam:  Physical Exam  Vitals signs and nursing note reviewed.   Constitutional:       Appearance: He is well-developed.   HENT:      Head: Normocephalic.   Cardiovascular:      Rate and Rhythm: Normal rate and regular rhythm.      Heart sounds: Normal heart sounds.   Pulmonary:      Effort: Pulmonary effort is normal.      Breath sounds: Normal breath sounds.   Abdominal:      General: Bowel sounds are normal.      Palpations: Abdomen is soft.   Skin:     General: Skin is warm and dry.   Neurological:      Mental Status: He is alert and oriented to person, place, and time.       Urinalysis:  Trace ketones, otherwise normal.    Assessment/Plan:   Prostate cancer:  Will check the patient's PSA.  We will notify him of the results.  Patient will continue Casodex as directed.    Will plan follow-up in 3 months, sooner if needed.  Problem List Items Addressed This Visit     None      Visit Diagnoses     Prostate cancer    -  Primary    Relevant Orders    Prostate Specific Antigen, Diagnostic    POCT Urinalysis (w/Micro Option)

## 2020-12-14 ENCOUNTER — TELEPHONE (OUTPATIENT)
Dept: UROLOGY | Facility: CLINIC | Age: 85
End: 2020-12-14

## 2021-05-14 ENCOUNTER — OFFICE VISIT (OUTPATIENT)
Dept: UROLOGY | Facility: CLINIC | Age: 86
End: 2021-05-14
Payer: MEDICARE

## 2021-05-14 ENCOUNTER — TELEPHONE (OUTPATIENT)
Dept: UROLOGY | Facility: CLINIC | Age: 86
End: 2021-05-14

## 2021-05-14 VITALS
HEART RATE: 85 BPM | HEIGHT: 69 IN | DIASTOLIC BLOOD PRESSURE: 62 MMHG | WEIGHT: 200 LBS | SYSTOLIC BLOOD PRESSURE: 130 MMHG | BODY MASS INDEX: 29.62 KG/M2 | RESPIRATION RATE: 18 BRPM

## 2021-05-14 DIAGNOSIS — N13.8 BPH WITH OBSTRUCTION/LOWER URINARY TRACT SYMPTOMS: ICD-10-CM

## 2021-05-14 DIAGNOSIS — N40.1 BPH WITH OBSTRUCTION/LOWER URINARY TRACT SYMPTOMS: ICD-10-CM

## 2021-05-14 DIAGNOSIS — R35.1 NOCTURIA: ICD-10-CM

## 2021-05-14 DIAGNOSIS — C61 PROSTATE CANCER: Primary | ICD-10-CM

## 2021-05-14 PROCEDURE — 99213 PR OFFICE/OUTPT VISIT, EST, LEVL III, 20-29 MIN: ICD-10-PCS | Mod: S$GLB,,, | Performed by: UROLOGY

## 2021-05-14 PROCEDURE — 99213 OFFICE O/P EST LOW 20 MIN: CPT | Mod: S$GLB,,, | Performed by: UROLOGY

## 2021-05-27 ENCOUNTER — TELEPHONE (OUTPATIENT)
Dept: UROLOGY | Facility: CLINIC | Age: 86
End: 2021-05-27

## 2021-06-04 ENCOUNTER — OFFICE VISIT (OUTPATIENT)
Dept: UROLOGY | Facility: CLINIC | Age: 86
End: 2021-06-04
Payer: MEDICARE

## 2021-06-04 VITALS
HEIGHT: 69 IN | BODY MASS INDEX: 29.62 KG/M2 | WEIGHT: 200 LBS | RESPIRATION RATE: 18 BRPM | HEART RATE: 70 BPM | SYSTOLIC BLOOD PRESSURE: 144 MMHG | DIASTOLIC BLOOD PRESSURE: 64 MMHG

## 2021-06-04 DIAGNOSIS — C61 PROSTATE CANCER: Primary | ICD-10-CM

## 2021-06-04 PROCEDURE — 99499 NO LOS: ICD-10-PCS | Mod: S$GLB,,, | Performed by: UROLOGY

## 2021-06-04 PROCEDURE — 96402 PR CHEMOTHER HORMON ANTINEOPL SUB-Q/IM: ICD-10-PCS | Mod: S$GLB,,, | Performed by: UROLOGY

## 2021-06-04 PROCEDURE — 96402 CHEMO HORMON ANTINEOPL SQ/IM: CPT | Mod: S$GLB,,, | Performed by: UROLOGY

## 2021-06-04 PROCEDURE — 99499 UNLISTED E&M SERVICE: CPT | Mod: S$GLB,,, | Performed by: UROLOGY

## 2021-06-04 RX ORDER — GABAPENTIN 300 MG/1
CAPSULE ORAL
COMMUNITY
Start: 2021-01-27 | End: 2023-04-28

## 2021-06-04 RX ORDER — DONEPEZIL HYDROCHLORIDE 10 MG/1
10 TABLET, FILM COATED ORAL NIGHTLY
COMMUNITY
Start: 2021-05-17

## 2021-06-04 RX ORDER — TRAZODONE HYDROCHLORIDE 50 MG/1
TABLET ORAL
COMMUNITY
Start: 2021-06-02 | End: 2023-04-28

## 2021-06-04 RX ORDER — MUPIROCIN 20 MG/G
OINTMENT TOPICAL
COMMUNITY
Start: 2021-05-17

## 2021-06-04 RX ORDER — TIZANIDINE 2 MG/1
TABLET ORAL
COMMUNITY
Start: 2021-01-12 | End: 2023-04-28

## 2021-06-04 RX ORDER — LEVOTHYROXINE SODIUM 125 UG/1
TABLET ORAL
COMMUNITY
Start: 2021-01-27 | End: 2022-11-18

## 2021-07-26 ENCOUNTER — OFFICE VISIT (OUTPATIENT)
Dept: UROLOGY | Facility: CLINIC | Age: 86
End: 2021-07-26
Payer: MEDICARE

## 2021-07-26 ENCOUNTER — TELEPHONE (OUTPATIENT)
Dept: UROLOGY | Facility: CLINIC | Age: 86
End: 2021-07-26

## 2021-07-26 DIAGNOSIS — N32.81 OVERACTIVE BLADDER: ICD-10-CM

## 2021-07-26 DIAGNOSIS — R35.0 URINARY FREQUENCY: ICD-10-CM

## 2021-07-26 DIAGNOSIS — N39.41 URGE INCONTINENCE: ICD-10-CM

## 2021-07-26 DIAGNOSIS — R82.90 ABNORMAL URINALYSIS: ICD-10-CM

## 2021-07-26 DIAGNOSIS — N40.1 BPH WITH OBSTRUCTION/LOWER URINARY TRACT SYMPTOMS: Primary | ICD-10-CM

## 2021-07-26 DIAGNOSIS — R39.15 URINARY URGENCY: ICD-10-CM

## 2021-07-26 DIAGNOSIS — B37.2 YEAST INFECTION OF THE SKIN: ICD-10-CM

## 2021-07-26 DIAGNOSIS — N13.8 BPH WITH OBSTRUCTION/LOWER URINARY TRACT SYMPTOMS: Primary | ICD-10-CM

## 2021-07-26 LAB — POC RESIDUAL URINE VOLUME: 0 ML (ref 0–100)

## 2021-07-26 PROCEDURE — 51798 US URINE CAPACITY MEASURE: CPT | Mod: S$GLB,,, | Performed by: REGISTERED NURSE

## 2021-07-26 PROCEDURE — 51798 POCT BLADDER SCAN: ICD-10-PCS | Mod: S$GLB,,, | Performed by: REGISTERED NURSE

## 2021-07-26 PROCEDURE — 99214 PR OFFICE/OUTPT VISIT, EST, LEVL IV, 30-39 MIN: ICD-10-PCS | Mod: S$GLB,,, | Performed by: REGISTERED NURSE

## 2021-07-26 PROCEDURE — 99214 OFFICE O/P EST MOD 30 MIN: CPT | Mod: S$GLB,,, | Performed by: REGISTERED NURSE

## 2021-07-26 RX ORDER — FLUCONAZOLE 150 MG/1
150 TABLET ORAL DAILY
Qty: 2 TABLET | Refills: 0 | Status: SHIPPED | OUTPATIENT
Start: 2021-07-26 | End: 2021-07-28

## 2021-07-26 RX ORDER — MIRABEGRON 25 MG/1
25 TABLET, FILM COATED, EXTENDED RELEASE ORAL DAILY
Qty: 90 TABLET | Refills: 3 | Status: SHIPPED | OUTPATIENT
Start: 2021-07-26 | End: 2021-07-26

## 2021-07-26 RX ORDER — OXYBUTYNIN CHLORIDE 5 MG/1
5 TABLET ORAL 3 TIMES DAILY
Qty: 90 TABLET | Refills: 0 | Status: SHIPPED | OUTPATIENT
Start: 2021-07-26 | End: 2021-10-11 | Stop reason: SDUPTHER

## 2021-07-26 RX ORDER — CLOTRIMAZOLE 1 %
CREAM (GRAM) TOPICAL 2 TIMES DAILY
Qty: 1 TUBE | Refills: 0 | Status: SHIPPED | OUTPATIENT
Start: 2021-07-26 | End: 2021-10-11

## 2021-07-26 RX ORDER — HYDROCORTISONE 25 MG/G
CREAM TOPICAL 2 TIMES DAILY
Qty: 1 TUBE | Refills: 0 | Status: SHIPPED | OUTPATIENT
Start: 2021-07-26 | End: 2023-04-28

## 2021-07-28 LAB — URINE CULTURE, ROUTINE: NORMAL

## 2021-09-01 ENCOUNTER — TELEPHONE (OUTPATIENT)
Dept: UROLOGY | Facility: CLINIC | Age: 86
End: 2021-09-01

## 2021-09-28 ENCOUNTER — OFFICE VISIT (OUTPATIENT)
Dept: UROLOGY | Facility: CLINIC | Age: 86
End: 2021-09-28
Payer: MEDICARE

## 2021-09-28 VITALS
HEIGHT: 69 IN | DIASTOLIC BLOOD PRESSURE: 55 MMHG | HEART RATE: 95 BPM | BODY MASS INDEX: 29.62 KG/M2 | SYSTOLIC BLOOD PRESSURE: 117 MMHG | RESPIRATION RATE: 18 BRPM | WEIGHT: 200 LBS

## 2021-09-28 DIAGNOSIS — C61 PROSTATE CANCER: Primary | ICD-10-CM

## 2021-09-28 DIAGNOSIS — Z79.818 ANDROGEN DEPRIVATION THERAPY: ICD-10-CM

## 2021-09-28 LAB
PSA, DIAGNOSTIC: 0.03 NG/ML (ref 0–4)
TESTOST SERPL-MCNC: <2.5 NG/DL (ref 193–740)

## 2021-09-28 PROCEDURE — 99499 UNLISTED E&M SERVICE: CPT | Mod: S$GLB,,, | Performed by: UROLOGY

## 2021-09-28 PROCEDURE — 36415 PR COLLECTION VENOUS BLOOD,VENIPUNCTURE: ICD-10-PCS | Mod: S$GLB,,, | Performed by: NURSE PRACTITIONER

## 2021-09-28 PROCEDURE — 99499 NO LOS: ICD-10-PCS | Mod: S$GLB,,, | Performed by: UROLOGY

## 2021-09-28 PROCEDURE — 96402 CHEMO HORMON ANTINEOPL SQ/IM: CPT | Mod: S$GLB,,, | Performed by: UROLOGY

## 2021-09-28 PROCEDURE — 36415 COLL VENOUS BLD VENIPUNCTURE: CPT | Mod: S$GLB,,, | Performed by: NURSE PRACTITIONER

## 2021-09-28 PROCEDURE — 96402 PR CHEMOTHER HORMON ANTINEOPL SUB-Q/IM: ICD-10-PCS | Mod: S$GLB,,, | Performed by: UROLOGY

## 2021-09-29 ENCOUNTER — TELEPHONE (OUTPATIENT)
Dept: UROLOGY | Facility: CLINIC | Age: 86
End: 2021-09-29

## 2021-10-11 ENCOUNTER — TELEPHONE (OUTPATIENT)
Dept: UROLOGY | Facility: CLINIC | Age: 86
End: 2021-10-11

## 2021-10-11 RX ORDER — CLOTRIMAZOLE 1 %
CREAM (GRAM) TOPICAL
Qty: 15 G | Refills: 0 | Status: SHIPPED | OUTPATIENT
Start: 2021-10-11 | End: 2022-01-03

## 2021-10-11 RX ORDER — OXYBUTYNIN CHLORIDE 5 MG/1
5 TABLET ORAL 3 TIMES DAILY
Qty: 90 TABLET | Refills: 11 | Status: SHIPPED | OUTPATIENT
Start: 2021-10-11 | End: 2022-11-14 | Stop reason: SDUPTHER

## 2022-01-11 ENCOUNTER — TELEPHONE (OUTPATIENT)
Dept: UROLOGY | Facility: CLINIC | Age: 87
End: 2022-01-11
Payer: MEDICARE

## 2022-01-11 ENCOUNTER — OFFICE VISIT (OUTPATIENT)
Dept: UROLOGY | Facility: CLINIC | Age: 87
End: 2022-01-11
Payer: MEDICARE

## 2022-01-11 VITALS — HEIGHT: 69 IN | WEIGHT: 200 LBS | BODY MASS INDEX: 29.62 KG/M2

## 2022-01-11 DIAGNOSIS — C61 MALIGNANT TUMOR OF PROSTATE: Primary | ICD-10-CM

## 2022-01-11 DIAGNOSIS — N48.1 BALANITIS: ICD-10-CM

## 2022-01-11 PROCEDURE — 96402 CHEMO HORMON ANTINEOPL SQ/IM: CPT | Mod: S$GLB,,, | Performed by: NURSE PRACTITIONER

## 2022-01-11 PROCEDURE — 99214 PR OFFICE/OUTPT VISIT, EST, LEVL IV, 30-39 MIN: ICD-10-PCS | Mod: 25,S$GLB,, | Performed by: NURSE PRACTITIONER

## 2022-01-11 PROCEDURE — 99214 OFFICE O/P EST MOD 30 MIN: CPT | Mod: 25,S$GLB,, | Performed by: NURSE PRACTITIONER

## 2022-01-11 PROCEDURE — 96402 PR CHEMOTHER HORMON ANTINEOPL SUB-Q/IM: ICD-10-PCS | Mod: S$GLB,,, | Performed by: NURSE PRACTITIONER

## 2022-01-11 RX ORDER — FLUCONAZOLE 150 MG/1
150 TABLET ORAL DAILY
Qty: 2 TABLET | Refills: 0 | Status: SHIPPED | OUTPATIENT
Start: 2022-01-11 | End: 2022-01-13

## 2022-01-11 RX ORDER — AMOXICILLIN AND CLAVULANATE POTASSIUM 875; 125 MG/1; MG/1
1 TABLET, FILM COATED ORAL 2 TIMES DAILY
Qty: 20 TABLET | Refills: 0 | Status: SHIPPED | OUTPATIENT
Start: 2022-01-11 | End: 2022-01-24

## 2022-01-11 NOTE — TELEPHONE ENCOUNTER
"Attempted to call pt back to see what was going on, no answer and phone number says "restrictions" after ringing for a while. HMlpn  "

## 2022-01-11 NOTE — PROGRESS NOTES
Subjective:       Patient ID: Mae Hill Sr. is a 88 y.o. male.    Chief Complaint: Prostate Cancer      HPI: 88-year-old male established patient, last seen approximately 3 months ago.  Patient has history of prostate cancer.  Patient was started on Lupron 3 months ago.  Patient was to have a repeat Lupron on 12/30/2021.  However, he no showed for that appointment.    Patient presents today with complaint of swelling and pain to his foreskin.  Patient states this started less than 1 week ago.  Patient states he has difficulty retracting his foreskin.  Patient states he has swelling with redness and pain with retracting foreskin.  He also has pain when touching him self.  Patient states the redness is pain is located along of the underneath of the head in distal penis.  Patient has a fungal cream and steroid cream which he mixes together.  Patient has not been using that recently.  He states he did start using it today.  He denies any pain or burning urination.  Denies any difficulty voiding.  He has a pretty good stream.  Denies having strain to void.  Denies any blood in urine.  Denies any odor urine denies any fever.  Denies any body aches.  Denies any significant weight loss.  No other urinary complaints at this time.       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 for 10 days    FLUCONAZOLE (DIFLUCAN) 150 MG TAB    Take 1 tablet (150 mg total) by mouth once daily. One by mouth now and repeat x1 on day 3 for 2 doses   Current Medications    ASPIRIN (ECOTRIN) 81 MG EC TABLET    Take 81 mg by mouth once daily.    ATORVASTATIN (LIPITOR) 40 MG TABLET    TAKE 1 TABLET BY MOUTH ONCE DAILY AT BEDTIME    BICALUTAMIDE (CASODEX) 50 MG TAB    Take 1 tablet (50 mg total) by mouth once daily.    CARVEDILOL (COREG) 3.125 MG TABLET    Take 3.125 mg by mouth 2 (two) times daily with meals.    CLOTRIMAZOLE (LOTRIMIN) 1 % CREAM    APPLY  CREAM TOPICALLY TO AFFECTED AREA TWICE DAILY. MIX 1:1 WITH HYDROCORTISONE CREAM AND APPLY TO AFFECTED AREA FOR 14 DAYS    DONEPEZIL (ARICEPT) 10 MG TABLET    Take 10 mg by mouth every evening.    EUTHYROX 125 MCG TABLET        FINASTERIDE (PROSCAR) 5 MG TABLET    Take 5 mg by mouth once daily.    FUROSEMIDE (LASIX) 40 MG TABLET    Take 40 mg by mouth once daily.    GABAPENTIN (NEURONTIN) 300 MG CAPSULE        HYDROCORTISONE 2.5 % CREAM    Apply topically 2 (two) times daily. Mix 1:1 with clotrimazole cream and apply to affected area. for 14 days    LEVOTHYROXINE (SYNTHROID) 125 MCG TABLET    Take by mouth once daily.     MUPIROCIN (BACTROBAN) 2 % OINTMENT    APPLY OINTMENT TOPICALLY TO AFFECTED AREA TWICE DAILY    OMEPRAZOLE (PRILOSEC) 40 MG CAPSULE    Take 40 mg by mouth once daily.    ONETOUCH ULTRASOFT LANCETS MISC    OneTouch UltraSoft Lancets   use as directed    OXYBUTYNIN (DITROPAN) 5 MG TAB    Take 1 tablet (5 mg total) by mouth 3 (three) times daily.    TAMSULOSIN (FLOMAX) 0.4 MG CAP    Take 0.4 mg by mouth once daily.    TIZANIDINE (ZANAFLEX) 2 MG TABLET        TRAZODONE (DESYREL) 50 MG TABLET            Past Social History:   Social History     Socioeconomic History    Marital status:    Tobacco Use     Smoking status: Never Smoker    Smokeless tobacco: Never Used   Substance and Sexual Activity    Alcohol use: Not Currently    Drug use: Never       Allergies:   Review of patient's allergies indicates:   Allergen Reactions    Codeine Itching and Nausea And Vomiting        Family History:   Family History   Problem Relation Age of Onset    Heart disease Mother     Heart disease Father         Review of Systems:  Review of Systems   Constitutional: Negative for activity change and appetite change.   HENT: Negative for congestion and dental problem.    Eyes: Negative for visual disturbance.   Respiratory: Negative for chest tightness and shortness of breath.    Cardiovascular: Negative for chest pain.   Gastrointestinal: Negative for abdominal distention and abdominal pain.   Genitourinary: Positive for penile pain and penile swelling. Negative for decreased urine volume, difficulty urinating, dysuria, enuresis, flank pain, frequency, genital sores, hematuria, penile discharge, scrotal swelling, testicular pain and urgency.   Musculoskeletal: Negative for back pain and neck pain.   Skin: Negative for color change.   Neurological: Negative for dizziness.   Hematological: Negative for adenopathy.   Psychiatric/Behavioral: Negative for agitation, behavioral problems and confusion.       Physical Exam:  Physical Exam  Vitals and nursing note reviewed.   Constitutional:       Appearance: He is well-developed and well-nourished.   HENT:      Head: Normocephalic.   Eyes:      Pupils: Pupils are equal, round, and reactive to light.   Cardiovascular:      Rate and Rhythm: Normal rate and regular rhythm.      Heart sounds: Normal heart sounds.   Pulmonary:      Effort: Pulmonary effort is normal.      Breath sounds: Normal breath sounds.   Abdominal:      General: Bowel sounds are normal.      Palpations: Abdomen is soft.   Genitourinary:     Penis: Uncircumcised. Phimosis, erythema and tenderness present.       Comments:  Able to retract foreskin.  Difficulty pulling the foreskin back over the glans.  Erythema, edema, and tenderness noted along the inferior portion of the glans penis.  Musculoskeletal:         General: Normal range of motion.      Cervical back: Normal range of motion and neck supple.   Skin:     General: Skin is warm and dry.   Neurological:      Mental Status: He is alert and oriented to person, place, and time.   Psychiatric:         Mood and Affect: Mood and affect normal.         Behavior: Behavior normal.       Urinalysis:  Trace intact blood, blood cells 0-2.    Assessment/Plan:   1. Prostate cancer:  Will check the patient's PSA.  We will notify him of the results.  Patient is due for Lupron today.  Patient again in 6 month Lupron injection.    2. Balanitis:  Patient will continue his cream as directed.  Will cover patient with antibiotics, Augmentin.  Will also treat with Diflucan.  Patient encouraged to keep his diabetes under control.  Explain to him the possible correlation between the swelling was foreskin and his sugars.  Patient will notify us if there is no improvement.    Patient will follow-up in 3 months, no availability with Dr. Rosado in 3 months.   Will arrange 6 month visit with Dr. Rosado, sooner if needed.  Problem List Items Addressed This Visit        Oncology    Malignant tumor of prostate - Primary    Relevant Medications    leuprolide (6 month) injection 45 mg (Start on 1/11/2022  5:00 PM)    Other Relevant Orders    POCT Urinalysis (w/Micro Option)    Prostate Specific Antigen, Diagnostic      Other Visit Diagnoses     Balanitis        Relevant Medications    amoxicillin-clavulanate 875-125mg (AUGMENTIN) 875-125 mg per tablet    fluconazole (DIFLUCAN) 150 MG Tab

## 2022-01-11 NOTE — TELEPHONE ENCOUNTER
----- Message from Hilda Meza sent at 1/11/2022 11:15 AM CST -----  Type:  Sooner Apoointment Request    Caller is requesting a sooner appointment.  Caller declined first available appointment listed below.  Caller will not accept being placed on the waitlist and is requesting a message be sent to doctor.  Name of Caller:Mae Hill Sr.  When is the first available appointment? 02/15  Symptoms: spot on penis   Would the patient rather a call back or a response via [facility]? Call back   Best Call Back Number:[phone] (home)   Additional Information: Pt stated that he needs to be seen immediately

## 2022-01-11 NOTE — PROGRESS NOTES
Lupron 45mg inj given per  [facility], pt asked to wait in lobby or room 15minutes to monitor for adverse reaction.

## 2022-01-12 ENCOUNTER — TELEPHONE (OUTPATIENT)
Dept: UROLOGY | Facility: CLINIC | Age: 87
End: 2022-01-12
Payer: MEDICARE

## 2022-01-12 LAB — PSA, DIAGNOSTIC: <0.014 NG/ML (ref 0–4)

## 2022-01-27 ENCOUNTER — OFFICE VISIT (OUTPATIENT)
Dept: UROLOGY | Facility: CLINIC | Age: 87
End: 2022-01-27
Payer: MEDICARE

## 2022-01-27 DIAGNOSIS — N48.1 BALANITIS: Primary | ICD-10-CM

## 2022-01-27 PROCEDURE — 99213 PR OFFICE/OUTPT VISIT, EST, LEVL III, 20-29 MIN: ICD-10-PCS | Mod: S$GLB,,, | Performed by: NURSE PRACTITIONER

## 2022-01-27 PROCEDURE — 99213 OFFICE O/P EST LOW 20 MIN: CPT | Mod: S$GLB,,, | Performed by: NURSE PRACTITIONER

## 2022-01-27 RX ORDER — FLUCONAZOLE 150 MG/1
150 TABLET ORAL DAILY
Qty: 2 TABLET | Refills: 0 | Status: SHIPPED | OUTPATIENT
Start: 2022-01-27 | End: 2022-01-29

## 2022-01-27 RX ORDER — NYSTATIN AND TRIAMCINOLONE ACETONIDE 100000; 1 [USP'U]/G; MG/G
OINTMENT TOPICAL 2 TIMES DAILY
Qty: 30 G | Refills: 0 | Status: SHIPPED | OUTPATIENT
Start: 2022-01-27 | End: 2022-11-18 | Stop reason: SDUPTHER

## 2022-01-27 NOTE — PROGRESS NOTES
Subjective:       Patient ID: Mae Hill Sr. is a 89 y.o. male.    Chief Complaint: Follow-up      HPI: 89-year-old, established patient, presents with complaint of penile pain.  Patient was seen approximately 2 weeks ago and diagnosed with a bound tightest.  Patient was already on a fungal and steroid cream.  Patient was started on Augmentin and Diflucan.  Patient presents today stating he has had no improvement.  Complains of difficulty with foreskin and pain to penis with touch.    He denies any pain or burning urination.  Denies any difficulty voiding.  States he has a good stream from start to finish.  Denies any odor the urine.  Denies any fever.  Denies any body aches.  Denies any blood in the urine.  Patient states he has been using Eucerin after voiding.    No other urinary complaints at this time.       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 One by mouth now and repeat x1 on day 3 for 2 doses    NYSTATIN-TRIAMCINOLONE (MYCOLOG) OINTMENT    Apply topically 2 (two) times daily.   Current Medications    AMOXICILLIN-CLAVULANATE 875-125MG (AUGMENTIN) 875-125 MG PER TABLET    Take 1 tablet by mouth twice daily for 10 days    ASPIRIN (ECOTRIN) 81 MG EC TABLET     Take 81 mg by mouth once daily.    ATORVASTATIN (LIPITOR) 40 MG TABLET    TAKE 1 TABLET BY MOUTH ONCE DAILY AT BEDTIME    BICALUTAMIDE (CASODEX) 50 MG TAB    Take 1 tablet (50 mg total) by mouth once daily.    CARVEDILOL (COREG) 3.125 MG TABLET    Take 3.125 mg by mouth 2 (two) times daily with meals.    CLOTRIMAZOLE (LOTRIMIN) 1 % CREAM    APPLY  CREAM TOPICALLY TO AFFECTED AREA TWICE DAILY. MIX 1:1 WITH HYDROCORTISONE CREAM AND APPLY TO AFFECTED AREA FOR 14 DAYS    DONEPEZIL (ARICEPT) 10 MG TABLET    Take 10 mg by mouth every evening.    EUTHYROX 125 MCG TABLET        FINASTERIDE (PROSCAR) 5 MG TABLET    Take 5 mg by mouth once daily.    FUROSEMIDE (LASIX) 40 MG TABLET    Take 40 mg by mouth once daily.    GABAPENTIN (NEURONTIN) 300 MG CAPSULE        HYDROCORTISONE 2.5 % CREAM    Apply topically 2 (two) times daily. Mix 1:1 with clotrimazole cream and apply to affected area. for 14 days    LEVOTHYROXINE (SYNTHROID) 125 MCG TABLET    Take by mouth once daily.     MUPIROCIN (BACTROBAN) 2 % OINTMENT    APPLY OINTMENT TOPICALLY TO AFFECTED AREA TWICE DAILY    OMEPRAZOLE (PRILOSEC) 40 MG CAPSULE    Take 40 mg by mouth once daily.    ONETOUCH ULTRASOFT LANCETS MISC    OneTouch UltraSoft Lancets   use as directed    OXYBUTYNIN (DITROPAN) 5 MG TAB    Take 1 tablet (5 mg total) by mouth 3 (three) times daily.    TAMSULOSIN (FLOMAX) 0.4 MG CAP    Take 0.4 mg by mouth once daily.    TIZANIDINE (ZANAFLEX) 2 MG TABLET        TRAZODONE (DESYREL) 50 MG TABLET            Past Social History:   Social History     Socioeconomic History    Marital status:    Tobacco Use    Smoking status: Never Smoker    Smokeless tobacco: Never Used   Substance and Sexual Activity    Alcohol use: Not Currently    Drug use: Never       Allergies:   Review of patient's allergies indicates:   Allergen Reactions    Codeine Itching and Nausea And Vomiting        Family History:   Family History   Problem Relation Age of Onset    Heart  disease Mother     Heart disease Father         Review of Systems:  Review of Systems   Constitutional: Negative for activity change and appetite change.   HENT: Negative for congestion and dental problem.    Respiratory: Negative for chest tightness and shortness of breath.    Cardiovascular: Negative for chest pain.   Gastrointestinal: Negative for abdominal distention and abdominal pain.   Genitourinary: Negative for decreased urine volume, difficulty urinating, dysuria, enuresis, flank pain, frequency, genital sores, hematuria, penile discharge, penile pain, penile swelling, scrotal swelling, testicular pain and urgency.   Musculoskeletal: Negative for back pain and neck pain.   Neurological: Negative for dizziness.   Hematological: Negative for adenopathy.   Psychiatric/Behavioral: Negative for agitation, behavioral problems and confusion.       Physical Exam:  Physical Exam  Vitals and nursing note reviewed.   Constitutional:       Appearance: He is well-developed and well-nourished.   HENT:      Head: Normocephalic.   Cardiovascular:      Rate and Rhythm: Normal rate and regular rhythm.      Heart sounds: Normal heart sounds.   Pulmonary:      Effort: Pulmonary effort is normal.      Breath sounds: Normal breath sounds.   Abdominal:      General: Bowel sounds are normal.      Palpations: Abdomen is soft.   Genitourinary:     Penis: Erythema present.       Comments: Able to retract foreskin without difficulty.  A birth the med noted around head of penis and glans penis.  No open wounds or sores noted.  Skin:     General: Skin is warm and dry.   Neurological:      Mental Status: He is alert and oriented to person, place, and time.         Assessment/Plan:   balantis:  The edema noted his foreskin has improved.  Able to more easily retract foreskin compared to visit 2 weeks ago.  Patient likely has a yeast infection.  Will treat with nystatin ointment and Diflucan.    Patient follow-up in 1 week,  sooner  Problem List Items Addressed This Visit    None     Visit Diagnoses     Balanitis    -  Primary    Relevant Medications    fluconazole (DIFLUCAN) 150 MG Tab    nystatin-triamcinolone (MYCOLOG) ointment

## 2022-02-03 ENCOUNTER — OFFICE VISIT (OUTPATIENT)
Dept: UROLOGY | Facility: CLINIC | Age: 87
End: 2022-02-03
Payer: MEDICARE

## 2022-02-03 DIAGNOSIS — N48.1 BALANITIS: Primary | ICD-10-CM

## 2022-02-03 PROCEDURE — 99213 OFFICE O/P EST LOW 20 MIN: CPT | Mod: S$GLB,,, | Performed by: NURSE PRACTITIONER

## 2022-02-03 PROCEDURE — 99213 PR OFFICE/OUTPT VISIT, EST, LEVL III, 20-29 MIN: ICD-10-PCS | Mod: S$GLB,,, | Performed by: NURSE PRACTITIONER

## 2022-02-03 NOTE — PROGRESS NOTES
Subjective:       Patient ID: Mae Hill Sr. is a 89 y.o. male.    Chief Complaint: Other (1 wk fu balanitis pt reports he is 90% better)      HPI: 89-year-old male, established patient, presents for 1 week follow-up.  Patient originally presented approximately 2 weeks ago with complaint penile pain.  Patient was treated for Balantis and yeast infection.  Initially patient had been started on a fungal cream by another provider.  He presented 1 week ago with no improvement and worsening symptoms.  That time patient was started on nystatin and Diflucan.    Patient states he has had significant improvement.  Patient states he is no longer having pain to the tip of the penis/glans penis.  He is more easily able to retract the foreskin.  He expresses satisfaction at this time.    No other urinary complaints this time.       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    ATORVASTATIN (LIPITOR) 40 MG TABLET    TAKE 1 TABLET BY MOUTH ONCE DAILY AT BEDTIME    BICALUTAMIDE (CASODEX) 50 MG TAB    Take 1 tablet (50 mg total) by mouth once daily.    CARVEDILOL  (COREG) 3.125 MG TABLET    Take 3.125 mg by mouth 2 (two) times daily with meals.    CLOTRIMAZOLE (LOTRIMIN) 1 % CREAM    APPLY  CREAM TOPICALLY TO AFFECTED AREA TWICE DAILY. MIX 1:1 WITH HYDROCORTISONE CREAM AND APPLY TO AFFECTED AREA FOR 14 DAYS    DONEPEZIL (ARICEPT) 10 MG TABLET    Take 10 mg by mouth every evening.    EUTHYROX 125 MCG TABLET        FINASTERIDE (PROSCAR) 5 MG TABLET    Take 5 mg by mouth once daily.    FUROSEMIDE (LASIX) 40 MG TABLET    Take 40 mg by mouth once daily.    GABAPENTIN (NEURONTIN) 300 MG CAPSULE        HYDROCORTISONE 2.5 % CREAM    Apply topically 2 (two) times daily. Mix 1:1 with clotrimazole cream and apply to affected area. for 14 days    LEVOTHYROXINE (SYNTHROID) 125 MCG TABLET    Take by mouth once daily.     MUPIROCIN (BACTROBAN) 2 % OINTMENT    APPLY OINTMENT TOPICALLY TO AFFECTED AREA TWICE DAILY    NYSTATIN-TRIAMCINOLONE (MYCOLOG) OINTMENT    Apply topically 2 (two) times daily.    OMEPRAZOLE (PRILOSEC) 40 MG CAPSULE    Take 40 mg by mouth once daily.    ONETOUCH ULTRASOFT LANCETS MISC    OneTouch UltraSoft Lancets   use as directed    OXYBUTYNIN (DITROPAN) 5 MG TAB    Take 1 tablet (5 mg total) by mouth 3 (three) times daily.    TAMSULOSIN (FLOMAX) 0.4 MG CAP    Take 0.4 mg by mouth once daily.    TIZANIDINE (ZANAFLEX) 2 MG TABLET        TRAZODONE (DESYREL) 50 MG TABLET            Past Social History:   Social History     Socioeconomic History    Marital status:    Tobacco Use    Smoking status: Never Smoker    Smokeless tobacco: Never Used   Substance and Sexual Activity    Alcohol use: Not Currently    Drug use: Never       Allergies:   Review of patient's allergies indicates:   Allergen Reactions    Codeine Itching and Nausea And Vomiting        Family History:   Family History   Problem Relation Age of Onset    Heart disease Mother     Heart disease Father         Review of Systems:  Review of Systems   Constitutional: Negative for activity change and  appetite change.   HENT: Negative for congestion and dental problem.    Respiratory: Negative for chest tightness and shortness of breath.    Cardiovascular: Negative for chest pain.   Gastrointestinal: Negative for abdominal distention and abdominal pain.   Genitourinary: Negative for decreased urine volume, difficulty urinating, dysuria, enuresis, flank pain, frequency, genital sores, hematuria, penile discharge, penile pain, penile swelling, scrotal swelling, testicular pain and urgency.   Musculoskeletal: Negative for back pain and neck pain.   Neurological: Negative for dizziness.   Hematological: Negative for adenopathy.   Psychiatric/Behavioral: Negative for agitation, behavioral problems and confusion.       Physical Exam:  Physical Exam  Vitals and nursing note reviewed.   Constitutional:       Appearance: He is well-developed and well-nourished.   HENT:      Head: Normocephalic.   Cardiovascular:      Rate and Rhythm: Normal rate and regular rhythm.      Heart sounds: Normal heart sounds.   Pulmonary:      Effort: Pulmonary effort is normal.      Breath sounds: Normal breath sounds.   Abdominal:      General: Bowel sounds are normal.      Palpations: Abdomen is soft.   Genitourinary:     Comments: Noted improvement noted on exam.  Erythema to penis has resolved.  Able to retract foreskin without difficulty.  No tenderness with palpation.  Skin:     General: Skin is warm and dry.   Neurological:      Mental Status: He is alert and oriented to person, place, and time.         Assessment/Plan:   Balantis:  Patient has noted improvement with nystatin ointment and Diflucan.  He is extremely happy with results at this time.  We did discuss possibly of recurrence.  Also discussed possibility of circumcision.  Patient will notify us for any recurrence.    Patient has appointment scheduled Dr. Rosado in 6 months.  He will keep that appointment as scheduled.  Follow-up sooner if needed.  Problem List Items  Addressed This Visit    None

## 2022-03-24 ENCOUNTER — TELEPHONE (OUTPATIENT)
Dept: UROLOGY | Facility: CLINIC | Age: 87
End: 2022-03-24
Payer: MEDICARE

## 2022-03-24 ENCOUNTER — OFFICE VISIT (OUTPATIENT)
Dept: UROLOGY | Facility: CLINIC | Age: 87
End: 2022-03-24
Payer: MEDICARE

## 2022-03-24 VITALS — WEIGHT: 200 LBS | RESPIRATION RATE: 20 BRPM | HEIGHT: 69 IN | BODY MASS INDEX: 29.62 KG/M2

## 2022-03-24 DIAGNOSIS — R35.0 URINARY FREQUENCY: ICD-10-CM

## 2022-03-24 DIAGNOSIS — R39.15 URINARY URGENCY: ICD-10-CM

## 2022-03-24 DIAGNOSIS — N13.8 BPH WITH OBSTRUCTION/LOWER URINARY TRACT SYMPTOMS: Primary | ICD-10-CM

## 2022-03-24 DIAGNOSIS — N40.1 BPH WITH OBSTRUCTION/LOWER URINARY TRACT SYMPTOMS: Primary | ICD-10-CM

## 2022-03-24 NOTE — TELEPHONE ENCOUNTER
Patient stated having trouble urinating and needing to see someone. Appt made for today. [facility] LPN

## 2022-03-24 NOTE — PROGRESS NOTES
Subjective:       Patient ID: Mae Hill Sr. is a 89 y.o. male.    Chief Complaint: f/u - Balantis, Urinary Urgency, and Urinary Frequency      HPI: 89-year-old male known service he has follow-up after being treated for a blanitus by ROGERIO Thibodeaux.  He states the medication in the oral Diflucan worked well for him.  His symptoms have resolved.  Patient has phimosis is unable to fully retract the foreskin.  He is peeing without difficulty.  He is scheduled for cardiac up procedure in the near future possible up pacer implant as well as a coronary catheterization.  He is a cancer of the prostate patient.  He received Lupron injection in January of this year.  He is due repeat PSA next month with follow-up in July for PSA and Lupron in the care of Dr. Rosado.  Most recent PSA 0.0.       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    ATORVASTATIN (LIPITOR) 40 MG TABLET    TAKE 1 TABLET BY MOUTH ONCE DAILY AT BEDTIME    BICALUTAMIDE (CASODEX) 50 MG TAB    Take 1 tablet (50 mg total) by mouth once daily.    CARVEDILOL (COREG) 3.125 MG TABLET    Take 3.125  mg by mouth 2 (two) times daily with meals.    CLOTRIMAZOLE (LOTRIMIN) 1 % CREAM    APPLY  CREAM TOPICALLY TO AFFECTED AREA TWICE DAILY. MIX 1:1 WITH HYDROCORTISONE CREAM AND APPLY TO AFFECTED AREA FOR 14 DAYS    DONEPEZIL (ARICEPT) 10 MG TABLET    Take 10 mg by mouth every evening.    EUTHYROX 125 MCG TABLET        FINASTERIDE (PROSCAR) 5 MG TABLET    Take 5 mg by mouth once daily.    FUROSEMIDE (LASIX) 40 MG TABLET    Take 40 mg by mouth once daily.    GABAPENTIN (NEURONTIN) 300 MG CAPSULE        HYDROCORTISONE 2.5 % CREAM    Apply topically 2 (two) times daily. Mix 1:1 with clotrimazole cream and apply to affected area. for 14 days    LEVOTHYROXINE (SYNTHROID) 125 MCG TABLET    Take by mouth once daily.     MUPIROCIN (BACTROBAN) 2 % OINTMENT    APPLY OINTMENT TOPICALLY TO AFFECTED AREA TWICE DAILY    NYSTATIN-TRIAMCINOLONE (MYCOLOG) OINTMENT    Apply topically 2 (two) times daily.    OMEPRAZOLE (PRILOSEC) 40 MG CAPSULE    Take 40 mg by mouth once daily.    ONETOUCH ULTRASOFT LANCETS MISC    OneTouch UltraSoft Lancets   use as directed    OXYBUTYNIN (DITROPAN) 5 MG TAB    Take 1 tablet (5 mg total) by mouth 3 (three) times daily.    TAMSULOSIN (FLOMAX) 0.4 MG CAP    Take 0.4 mg by mouth once daily.    TIZANIDINE (ZANAFLEX) 2 MG TABLET        TRAZODONE (DESYREL) 50 MG TABLET            Past Social History:   Social History     Socioeconomic History    Marital status:    Tobacco Use    Smoking status: Never Smoker    Smokeless tobacco: Never Used   Substance and Sexual Activity    Alcohol use: Not Currently    Drug use: Never       Allergies:   Review of patient's allergies indicates:   Allergen Reactions    Codeine Itching and Nausea And Vomiting        Family History:   Family History   Problem Relation Age of Onset    Heart disease Mother     Heart disease Father         Review of Systems:  Review of Systems   Constitutional: Negative for activity change and appetite change.   HENT: Negative for  congestion and dental problem.    Eyes: Negative for visual disturbance.   Respiratory: Negative for chest tightness and shortness of breath.    Cardiovascular: Negative for chest pain.   Gastrointestinal: Negative for abdominal distention and abdominal pain.   Genitourinary: Negative for decreased urine volume, difficulty urinating, dysuria, enuresis, flank pain, frequency, genital sores, hematuria, penile discharge, penile pain, penile swelling, scrotal swelling, testicular pain and urgency.   Musculoskeletal: Negative for back pain and neck pain.   Skin: Negative for color change.   Neurological: Negative for dizziness.   Hematological: Negative for adenopathy.   Psychiatric/Behavioral: Negative for agitation, behavioral problems and confusion.       Physical Exam:  Physical Exam  Constitutional:       Appearance: He is well-developed.   HENT:      Head: Normocephalic.   Eyes:      General: No scleral icterus.  Pulmonary:      Effort: Pulmonary effort is normal.      Breath sounds: Normal breath sounds.   Abdominal:      General: There is no distension.      Palpations: Abdomen is soft.      Tenderness: There is no abdominal tenderness.      Hernia: No hernia is present. There is no hernia in the right inguinal area or left inguinal area.   Genitourinary:     Penis: Normal.       Testes: Normal. Cremasteric reflex is present.   Musculoskeletal:      Cervical back: Normal range of motion.   Skin:     General: Skin is warm and dry.   Neurological:      Mental Status: He is alert and oriented to person, place, and time.         Assessment/Plan:   Blanitus--resolved, patient is adequate medication on hand should he have a recurrence.      Phimosis--of instructed patient not to attempt to force foreskin past the glans.  This could result in a medical emergency.  He questions a circumcision.  I told was could not be considered till after patient completed his cardiac workup in interventions would have to be  re-evaluated at that time.    Prostate cancer--responding well to Lupron.  Due next month for repeat PSA  Problem List Items Addressed This Visit    None     Visit Diagnoses     BPH with obstruction/lower urinary tract symptoms    -  Primary    Urinary frequency        Relevant Orders    POCT Urinalysis (w/Micro Option)    POCT Bladder Scan    Urinary urgency        Relevant Orders    POCT Urinalysis (w/Micro Option)    POCT Bladder Scan

## 2022-05-20 DIAGNOSIS — K22.4 ESOPHAGEAL DYSMOTILITY: Primary | ICD-10-CM

## 2022-06-09 ENCOUNTER — TELEPHONE (OUTPATIENT)
Dept: GASTROENTEROLOGY | Facility: CLINIC | Age: 87
End: 2022-06-09
Payer: MEDICARE

## 2022-06-09 ENCOUNTER — OFFICE VISIT (OUTPATIENT)
Dept: UROLOGY | Facility: CLINIC | Age: 87
End: 2022-06-09
Payer: MEDICARE

## 2022-06-09 VITALS
SYSTOLIC BLOOD PRESSURE: 123 MMHG | HEART RATE: 70 BPM | DIASTOLIC BLOOD PRESSURE: 56 MMHG | BODY MASS INDEX: 27.7 KG/M2 | WEIGHT: 187 LBS | HEIGHT: 69 IN

## 2022-06-09 DIAGNOSIS — N47.1 PHIMOSIS: Primary | ICD-10-CM

## 2022-06-09 DIAGNOSIS — R39.15 URINARY URGENCY: ICD-10-CM

## 2022-06-09 DIAGNOSIS — R35.0 URINARY FREQUENCY: ICD-10-CM

## 2022-06-09 PROCEDURE — 99214 OFFICE O/P EST MOD 30 MIN: CPT | Mod: S$GLB,,, | Performed by: NURSE PRACTITIONER

## 2022-06-09 PROCEDURE — 99214 PR OFFICE/OUTPT VISIT, EST, LEVL IV, 30-39 MIN: ICD-10-PCS | Mod: S$GLB,,, | Performed by: NURSE PRACTITIONER

## 2022-06-09 RX ORDER — APIXABAN 5 MG/1
5 TABLET, FILM COATED ORAL 2 TIMES DAILY
COMMUNITY
Start: 2022-04-11

## 2022-06-09 RX ORDER — CARVEDILOL 6.25 MG/1
6.25 TABLET ORAL 2 TIMES DAILY
COMMUNITY
Start: 2022-04-13

## 2022-06-09 RX ORDER — MEMANTINE HYDROCHLORIDE 10 MG/1
10 TABLET ORAL 2 TIMES DAILY
COMMUNITY
Start: 2022-04-14

## 2022-06-09 RX ORDER — FLUCONAZOLE 150 MG/1
150 TABLET ORAL DAILY
Qty: 2 TABLET | Refills: 0 | Status: SHIPPED | OUTPATIENT
Start: 2022-06-09 | End: 2022-06-11

## 2022-06-09 NOTE — PROGRESS NOTES
Subjective:       Patient ID: Mae Hill Sr. is a 89 y.o. male.    Chief Complaint: Other (Has appt next [facility] derrick/naina)      HPI: 89-year-old male, established patient.  Patient has a history of phimosis.  We have had discussions about a circumcision in the past.  Patient has not been interested.    Patient presents stating he was having some episodes of frequency and urgency.  However, he had a prescription of oxybutynin.  He restarted it.  Oxybutynin 3 times a day.  He states since starting the oxybutynin he has noticed improvement in his frequency urgency.    As did the patient has a history of phimosis with occasional bowel otitis.  He uses nystatin ointment 3 days a week to help fight recurrence.  I he does complain of some redness and hotness to his penis.    Denies any difficulty voiding.  States he has a pretty good stream from start to finish.  Denies any odor urine.  Denies fever denies any body aches.  Denies any blood in urine.  No other urinary complaints at this time.       Past Medical History:   Past Medical History:   Diagnosis Date    Anxiety     Balanitis     Chronic renal insufficiency, stage III (moderate)     COPD (chronic obstructive pulmonary disease)     Depression     Heart failure     L    High cholesterol     History of BPH     Hypertension     Prostate CA     Vascular dementia        Past Surgical Historical:   Past Surgical History:   Procedure Laterality Date    ABOVE-KNEE AMPUTATION Right 10/13/2021    with VAC    BELOW KNEE AMPUTATION OF LOWER EXTREMITY Right     COMBINED RIGHT AND TRANSSEPTAL (EXISTING OPENING) LEFT HEART CATHETERIZATION      CYSTOSCOPY      HERNIA REPAIR          Medications:   Medication List with Changes/Refills   New Medications    FLUCONAZOLE (DIFLUCAN) 150 MG TAB    Take 1 tablet (150 mg total) by mouth once daily. One by mouth now and repeat x1 on day 3 for 2 doses   Current Medications    AMOXICILLIN-CLAVULANATE 875-125MG (AUGMENTIN)  875-125 MG PER TABLET    Take 1 tablet by mouth twice daily for 10 days    ASPIRIN (ECOTRIN) 81 MG EC TABLET    Take 81 mg by mouth once daily.    ATORVASTATIN (LIPITOR) 40 MG TABLET    TAKE 1 TABLET BY MOUTH ONCE DAILY AT BEDTIME    BICALUTAMIDE (CASODEX) 50 MG TAB    Take 1 tablet (50 mg total) by mouth once daily.    CARVEDILOL (COREG) 6.25 MG TABLET    Take 6.25 mg by mouth 2 (two) times daily.    CLOTRIMAZOLE (LOTRIMIN) 1 % CREAM    APPLY  CREAM TOPICALLY TO AFFECTED AREA TWICE DAILY. MIX 1:1 WITH HYDROCORTISONE CREAM AND APPLY TO AFFECTED AREA FOR 14 DAYS    DONEPEZIL (ARICEPT) 10 MG TABLET    Take 10 mg by mouth every evening.    ELIQUIS 5 MG TAB    Take 5 mg by mouth 2 (two) times daily.    EUTHYROX 125 MCG TABLET        FINASTERIDE (PROSCAR) 5 MG TABLET    Take 5 mg by mouth once daily.    FUROSEMIDE (LASIX) 40 MG TABLET    Take 40 mg by mouth once daily.    GABAPENTIN (NEURONTIN) 300 MG CAPSULE        HYDROCORTISONE 2.5 % CREAM    Apply topically 2 (two) times daily. Mix 1:1 with clotrimazole cream and apply to affected area. for 14 days    LEVOTHYROXINE (SYNTHROID) 125 MCG TABLET    Take by mouth once daily.     MEMANTINE (NAMENDA) 10 MG TAB    Take 10 mg by mouth 2 (two) times daily.    MUPIROCIN (BACTROBAN) 2 % OINTMENT    APPLY OINTMENT TOPICALLY TO AFFECTED AREA TWICE DAILY    NYSTATIN-TRIAMCINOLONE (MYCOLOG) OINTMENT    Apply topically 2 (two) times daily.    OMEPRAZOLE (PRILOSEC) 40 MG CAPSULE    Take 40 mg by mouth once daily.    ONETOUCH ULTRASOFT LANCETS MISC    OneTouch UltraSoft Lancets   use as directed    OXYBUTYNIN (DITROPAN) 5 MG TAB    Take 1 tablet (5 mg total) by mouth 3 (three) times daily.    TAMSULOSIN (FLOMAX) 0.4 MG CAP    Take 0.4 mg by mouth once daily.    TIZANIDINE (ZANAFLEX) 2 MG TABLET        TRAZODONE (DESYREL) 50 MG TABLET       Discontinued Medications    CARVEDILOL (COREG) 3.125 MG TABLET    Take 3.125 mg by mouth 2 (two) times daily with meals.        Past Social History:    Social History     Socioeconomic History    Marital status:    Tobacco Use    Smoking status: Never Smoker    Smokeless tobacco: Never Used   Substance and Sexual Activity    Alcohol use: Not Currently    Drug use: Never       Allergies:   Review of patient's allergies indicates:   Allergen Reactions    Codeine Itching and Nausea And Vomiting        Family History:   Family History   Problem Relation Age of Onset    Heart disease Mother     Heart disease Father         Review of Systems:  Review of Systems   Constitutional: Negative for activity change and appetite change.   HENT: Negative for congestion and dental problem.    Eyes: Negative for visual disturbance.   Respiratory: Negative for chest tightness and shortness of breath.    Cardiovascular: Negative for chest pain.   Gastrointestinal: Negative for abdominal distention and abdominal pain.   Genitourinary: Positive for frequency and urgency. Negative for decreased urine volume, difficulty urinating, dysuria, enuresis, flank pain, genital sores, hematuria, penile discharge, penile pain, penile swelling, scrotal swelling and testicular pain.   Musculoskeletal: Negative for back pain and neck pain.   Skin: Negative for color change.   Neurological: Negative for dizziness.   Hematological: Negative for adenopathy.   Psychiatric/Behavioral: Negative for agitation, behavioral problems and confusion.       Physical Exam:  Physical Exam  Vitals and nursing note reviewed.   Constitutional:       Appearance: He is well-developed.   HENT:      Head: Normocephalic.   Eyes:      Pupils: Pupils are equal, round, and reactive to light.   Cardiovascular:      Rate and Rhythm: Normal rate and regular rhythm.      Heart sounds: Normal heart sounds.   Pulmonary:      Effort: Pulmonary effort is normal.      Breath sounds: Normal breath sounds.   Abdominal:      General: Bowel sounds are normal.      Palpations: Abdomen is soft.   Genitourinary:     Penis:  Phimosis and erythema present.       Comments: Phimosis noted.  Mild erythema.  No discharge.  Musculoskeletal:         General: Normal range of motion.      Cervical back: Normal range of motion and neck supple.   Skin:     General: Skin is warm and dry.   Neurological:      Mental Status: He is alert and oriented to person, place, and time.   Psychiatric:         Behavior: Behavior normal.       Urinalysis:  Trace ketones, otherwise normal.      Assessment/Plan:   1. Phimosis:  Patient's chronic phimosis.  However he is able to void well without any difficulty.  I have discussed circumcision is before.  We did briefly discuss it again.  Patient is still not interested at this time.  Patient continue nystatin 3 times a week.  Will re-treat with Diflucan.    2. Frequency/urgency:  Patient has frequency and urgency which improved when she restarted oxybutynin 3 times a day.  Patient advised to continue his oxybutynin.    Patient has appointment scheduled Dr. Rosado on 07/19/2022.  Advised get appointment as scheduled, sooner if needed.  Problem List Items Addressed This Visit    None     Visit Diagnoses     Phimosis    -  Primary    Relevant Medications    fluconazole (DIFLUCAN) 150 MG Tab    Other Relevant Orders    POCT Urinalysis (w/Micro Option)    Urinary frequency        Urinary urgency

## 2022-07-19 ENCOUNTER — OFFICE VISIT (OUTPATIENT)
Dept: UROLOGY | Facility: CLINIC | Age: 87
End: 2022-07-19
Payer: MEDICARE

## 2022-07-19 VITALS — SYSTOLIC BLOOD PRESSURE: 134 MMHG | HEART RATE: 72 BPM | TEMPERATURE: 99 F | DIASTOLIC BLOOD PRESSURE: 74 MMHG

## 2022-07-19 DIAGNOSIS — C61 PROSTATE CANCER: Primary | ICD-10-CM

## 2022-07-19 LAB — PSA, DIAGNOSTIC: <0.014 NG/ML (ref 0–4)

## 2022-07-19 PROCEDURE — 99214 OFFICE O/P EST MOD 30 MIN: CPT | Mod: 25,S$GLB,, | Performed by: UROLOGY

## 2022-07-19 PROCEDURE — 99214 PR OFFICE/OUTPT VISIT, EST, LEVL IV, 30-39 MIN: ICD-10-PCS | Mod: 25,S$GLB,, | Performed by: UROLOGY

## 2022-07-19 PROCEDURE — 96402 PR CHEMOTHER HORMON ANTINEOPL SUB-Q/IM: ICD-10-PCS | Mod: S$GLB,,, | Performed by: UROLOGY

## 2022-07-19 PROCEDURE — 96402 CHEMO HORMON ANTINEOPL SQ/IM: CPT | Mod: S$GLB,,, | Performed by: UROLOGY

## 2022-07-19 NOTE — PROGRESS NOTES
Subjective:       Patient ID: Mae Hill Sr. is a 89 y.o. male.    Chief Complaint: Other (6 month lupron)      HPI:  89-year-old male with prostate cancer he has been on antiandrogen monotherapy by another urology provider for several years he developed breast tenderness I have switched him over to Lupron he is doing well on this without complaints.    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    ATORVASTATIN (LIPITOR) 40 MG TABLET    TAKE 1 TABLET BY MOUTH ONCE DAILY AT BEDTIME    BICALUTAMIDE (CASODEX) 50 MG TAB    Take 1 tablet (50 mg total) by mouth once daily.    CARVEDILOL (COREG) 6.25 MG TABLET    Take 6.25 mg by mouth 2 (two) times daily.    CLOTRIMAZOLE (LOTRIMIN) 1 % CREAM    APPLY  CREAM TOPICALLY TO AFFECTED AREA TWICE DAILY. MIX 1:1 WITH HYDROCORTISONE CREAM AND APPLY TO AFFECTED AREA FOR 14 DAYS    DONEPEZIL (ARICEPT) 10 MG TABLET    Take 10 mg by mouth every evening.    ELIQUIS 5 MG TAB    Take 5 mg by mouth 2 (two) times daily.    EUTHYROX 125 MCG TABLET        FINASTERIDE (PROSCAR) 5 MG TABLET    Take 5 mg by mouth once daily.     FUROSEMIDE (LASIX) 40 MG TABLET    Take 40 mg by mouth once daily.    GABAPENTIN (NEURONTIN) 300 MG CAPSULE        HYDROCORTISONE 2.5 % CREAM    Apply topically 2 (two) times daily. Mix 1:1 with clotrimazole cream and apply to affected area. for 14 days    LEVOTHYROXINE (SYNTHROID) 125 MCG TABLET    Take by mouth once daily.     MEMANTINE (NAMENDA) 10 MG TAB    Take 10 mg by mouth 2 (two) times daily.    MUPIROCIN (BACTROBAN) 2 % OINTMENT    APPLY OINTMENT TOPICALLY TO AFFECTED AREA TWICE DAILY    NYSTATIN-TRIAMCINOLONE (MYCOLOG) OINTMENT    Apply topically 2 (two) times daily.    OMEPRAZOLE (PRILOSEC) 40 MG CAPSULE    Take 40 mg by mouth once daily.    ONETOUCH ULTRASOFT LANCETS MISC    OneTouch UltraSoft Lancets   use as directed    OXYBUTYNIN (DITROPAN) 5 MG TAB    Take 1 tablet (5 mg total) by mouth 3 (three) times daily.    TAMSULOSIN (FLOMAX) 0.4 MG CAP    Take 0.4 mg by mouth once daily.    TIZANIDINE (ZANAFLEX) 2 MG TABLET        TRAZODONE (DESYREL) 50 MG TABLET            Past Social History:   Social History     Socioeconomic History    Marital status:    Tobacco Use    Smoking status: Never Smoker    Smokeless tobacco: Never Used   Substance and Sexual Activity    Alcohol use: Not Currently    Drug use: Never       Allergies:   Review of patient's allergies indicates:   Allergen Reactions    Codeine Itching and Nausea And Vomiting        Family History:   Family History   Problem Relation Age of Onset    Heart disease Mother     Heart disease Father         Review of Systems:  Review of Systems   Constitutional: Negative for activity change and appetite change.   HENT: Negative for congestion and dental problem.    Eyes: Negative for visual disturbance.   Respiratory: Negative for chest tightness and shortness of breath.    Cardiovascular: Negative for chest pain.   Gastrointestinal: Negative for abdominal distention and abdominal pain.   Genitourinary: Negative for decreased urine volume,  difficulty urinating, dysuria, enuresis, flank pain, frequency, genital sores, hematuria, penile discharge, penile pain, penile swelling, scrotal swelling, testicular pain and urgency.   Musculoskeletal: Negative for back pain and neck pain.   Skin: Negative for color change.   Neurological: Negative for dizziness.   Hematological: Negative for adenopathy.   Psychiatric/Behavioral: Negative for agitation, behavioral problems and confusion.       Physical Exam:  Physical Exam  Constitutional:       General: He is not in acute distress.     Appearance: He is well-developed.   HENT:      Head: Normocephalic and atraumatic.      Nose: Nose normal.   Eyes:      General: No scleral icterus.     Conjunctiva/sclera: Conjunctivae normal.      Pupils: Pupils are equal, round, and reactive to light.   Neck:      Thyroid: No thyromegaly.      Trachea: No tracheal deviation.   Cardiovascular:      Rate and Rhythm: Normal rate and regular rhythm.      Heart sounds: Normal heart sounds.   Pulmonary:      Effort: Pulmonary effort is normal. No respiratory distress.      Breath sounds: Normal breath sounds. No wheezing or rales.   Abdominal:      General: Bowel sounds are normal. There is no distension.      Palpations: Abdomen is soft.      Tenderness: There is no abdominal tenderness. There is no guarding or rebound.   Genitourinary:     Penis: Normal. No tenderness.       Prostate: Normal.   Musculoskeletal:         General: No deformity. Normal range of motion.      Cervical back: Neck supple.   Lymphadenopathy:      Cervical: No cervical adenopathy.   Skin:     General: Skin is warm and dry.      Findings: No erythema or rash.   Neurological:      Mental Status: He is alert and oriented to person, place, and time.      Cranial Nerves: No cranial nerve deficit.   Psychiatric:         Behavior: Behavior normal.         Assessment/Plan:       Problem List Items Addressed This Visit    None     Visit Diagnoses     Prostate cancer     -  Primary    Relevant Medications    leuprolide acetate (6 month) injection 45 mg (Start on 7/19/2022  2:45 PM)             Prostate cancer:  Continue Lupron draw the patient's PSA today return to clinic in 6 months

## 2022-07-20 ENCOUNTER — TELEPHONE (OUTPATIENT)
Dept: UROLOGY | Facility: CLINIC | Age: 87
End: 2022-07-20
Payer: MEDICARE

## 2022-07-20 NOTE — TELEPHONE ENCOUNTER
----- Message from Ritchie Rosado MD sent at 7/20/2022  8:07 AM CDT -----  Please inform pt of undetectable psa   Yes

## 2022-07-20 NOTE — TELEPHONE ENCOUNTER
Attempted to call patient. Went straight to  and it has not been set up to take messages.    ----- Message from Ritchie Rosado MD sent at 7/20/2022  8:07 AM CDT -----  Please inform pt of undetectable psa     within normal limits

## 2022-08-25 ENCOUNTER — TELEPHONE (OUTPATIENT)
Dept: UROLOGY | Facility: CLINIC | Age: 87
End: 2022-08-25
Payer: MEDICARE

## 2022-08-25 NOTE — TELEPHONE ENCOUNTER
Attempted to contact pt back, goes straight to voicemail which is not set up. Looking back on urology notes I do not see where we ever prescribed finasteride. Looks like pcp augustine prescribed it before. HMlpn

## 2022-08-25 NOTE — TELEPHONE ENCOUNTER
----- Message from Austin Quinones sent at 8/25/2022  9:31 AM CDT -----  Contact: pt  Pt wants to know if he is supposed too take finasteride (PROSCAR) 5 mg tablet  [phone]

## 2022-11-14 RX ORDER — OXYBUTYNIN CHLORIDE 5 MG/1
5 TABLET ORAL 3 TIMES DAILY
Qty: 90 TABLET | Refills: 1 | Status: SHIPPED | OUTPATIENT
Start: 2022-11-14 | End: 2023-11-14

## 2022-11-16 ENCOUNTER — TELEPHONE (OUTPATIENT)
Dept: UROLOGY | Facility: CLINIC | Age: 87
End: 2022-11-16

## 2022-11-16 ENCOUNTER — CLINICAL SUPPORT (OUTPATIENT)
Dept: UROLOGY | Facility: CLINIC | Age: 87
End: 2022-11-16
Payer: MEDICARE

## 2022-11-16 NOTE — TELEPHONE ENCOUNTER
Pt called regarding possible UTI. Advised pt to come by office and drop sample off. Pt will be here at 3.    ----- Message from Albin Ledesma sent at 11/16/2022 12:46 PM CST -----  Contact: self  Pt called and asked if he can get something called out for a uti he thinks he has. Pt asked for a call back at [phone]

## 2022-11-18 ENCOUNTER — OFFICE VISIT (OUTPATIENT)
Dept: UROLOGY | Facility: CLINIC | Age: 87
End: 2022-11-18
Payer: MEDICARE

## 2022-11-18 DIAGNOSIS — N48.1 BALANITIS: ICD-10-CM

## 2022-11-18 DIAGNOSIS — R30.0 DYSURIA: ICD-10-CM

## 2022-11-18 PROCEDURE — 99213 PR OFFICE/OUTPT VISIT, EST, LEVL III, 20-29 MIN: ICD-10-PCS | Mod: S$GLB,,, | Performed by: FAMILY MEDICINE

## 2022-11-18 PROCEDURE — 99213 OFFICE O/P EST LOW 20 MIN: CPT | Mod: S$GLB,,, | Performed by: FAMILY MEDICINE

## 2022-11-18 RX ORDER — NYSTATIN AND TRIAMCINOLONE ACETONIDE 100000; 1 [USP'U]/G; MG/G
OINTMENT TOPICAL 2 TIMES DAILY
Qty: 30 G | Refills: 0 | Status: SHIPPED | OUTPATIENT
Start: 2022-11-18

## 2022-11-18 RX ORDER — LEVOTHYROXINE SODIUM 75 UG/1
75 TABLET ORAL DAILY
COMMUNITY
Start: 2022-09-21 | End: 2023-07-25

## 2022-11-18 NOTE — PROGRESS NOTES
Subjective:       Patient ID: Mae Hill Sr. is a 89 y.o. male.    Chief Complaint: Benign Prostatic Hypertrophy      HPI: 89-year-old male patient presented to the clinic today for follow-up.  He did a urinalysis on 11/16/2022 and it was negative.  Patient was experiencing what he described as urinary tract infection symptoms.    Benign Prostatic Hypertrophy       Past Medical History:   Past Medical History:   Diagnosis Date    Anxiety     Balanitis     Chronic renal insufficiency, stage III (moderate)     COPD (chronic obstructive pulmonary disease)     Depression     Heart failure     L    High cholesterol     History of BPH     Hypertension     Prostate CA     Vascular dementia        Past Surgical Historical:   Past Surgical History:   Procedure Laterality Date    ABOVE-KNEE AMPUTATION Right 10/13/2021    with VAC    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    ATORVASTATIN (LIPITOR) 40 MG TABLET    TAKE 1 TABLET BY MOUTH ONCE DAILY AT BEDTIME    BICALUTAMIDE (CASODEX) 50 MG TAB    Take 1 tablet (50 mg total) by mouth once daily.    BLOOD SUGAR DIAGNOSTIC STRP    OneTouch Ultra Blue Test Strips   use TID as directed    CARVEDILOL (COREG) 6.25 MG TABLET    Take 6.25 mg by mouth 2 (two) times daily.    CLOTRIMAZOLE (LOTRIMIN) 1 % CREAM    APPLY  CREAM TOPICALLY TO AFFECTED AREA TWICE DAILY. MIX 1:1 WITH HYDROCORTISONE CREAM AND APPLY TO AFFECTED AREA FOR 14 DAYS    DONEPEZIL (ARICEPT) 10 MG TABLET    Take 10 mg by mouth every evening.    ELIQUIS 5 MG TAB    Take 5 mg by mouth 2 (two) times daily.    FINASTERIDE (PROSCAR) 5 MG TABLET    Take 5 mg by mouth once daily.    FUROSEMIDE (LASIX) 40 MG TABLET    Take 40 mg by mouth once daily.     GABAPENTIN (NEURONTIN) 300 MG CAPSULE        HYDROCORTISONE 2.5 % CREAM    Apply topically 2 (two) times daily. Mix 1:1 with clotrimazole cream and apply to affected area. for 14 days    LEVOTHYROXINE (SYNTHROID) 75 MCG TABLET    Take 75 mcg by mouth once daily.    MEMANTINE (NAMENDA) 10 MG TAB    Take 10 mg by mouth 2 (two) times daily.    MUPIROCIN (BACTROBAN) 2 % OINTMENT    APPLY OINTMENT TOPICALLY TO AFFECTED AREA TWICE DAILY    OMEPRAZOLE (PRILOSEC) 40 MG CAPSULE    Take 40 mg by mouth once daily.    ONETOUCH ULTRASOFT LANCETS MISC    OneTouch UltraSoft Lancets   use as directed    OXYBUTYNIN (DITROPAN) 5 MG TAB    Take 1 tablet (5 mg total) by mouth 3 (three) times daily.    TAMSULOSIN (FLOMAX) 0.4 MG CAP    Take 0.4 mg by mouth once daily.    TIZANIDINE (ZANAFLEX) 2 MG TABLET        TRAZODONE (DESYREL) 50 MG TABLET       Changed and/or Refilled Medications    Modified Medication Previous Medication    NYSTATIN-TRIAMCINOLONE (MYCOLOG) OINTMENT nystatin-triamcinolone (MYCOLOG) ointment       Apply topically 2 (two) times daily.    Apply topically 2 (two) times daily.   Discontinued Medications    EUTHYROX 125 MCG TABLET        LEVOTHYROXINE (SYNTHROID) 125 MCG TABLET    Take by mouth once daily.         Past Social History:   Social History     Socioeconomic History    Marital status:    Tobacco Use    Smoking status: Never    Smokeless tobacco: Never   Substance and Sexual Activity    Alcohol use: Not Currently    Drug use: Never       Allergies:   Review of patient's allergies indicates:   Allergen Reactions    Codeine Itching and Nausea And Vomiting        Family History:   Family History   Problem Relation Age of Onset    Heart disease Mother     Heart disease Father         Review of Systems:  Review of Systems   Constitutional: Negative.    HENT: Negative.     Eyes: Negative.    Respiratory: Negative.     Cardiovascular: Negative.    Gastrointestinal: Negative.    Endocrine: Negative.     Genitourinary: Negative.    Musculoskeletal: Negative.    Skin: Negative.    Allergic/Immunologic: Negative.    Neurological: Negative.    Hematological: Negative.    Psychiatric/Behavioral: Negative.       Physical Exam:  Physical Exam  Constitutional:       Appearance: Normal appearance. He is normal weight.   HENT:      Head: Normocephalic.      Nose: Nose normal.      Mouth/Throat:      Mouth: Mucous membranes are moist.      Pharynx: Oropharynx is clear.   Eyes:      Pupils: Pupils are equal, round, and reactive to light.   Cardiovascular:      Rate and Rhythm: Normal rate.   Pulmonary:      Effort: Pulmonary effort is normal.   Genitourinary:     Penis: Normal.    Musculoskeletal:         General: Normal range of motion.      Cervical back: Normal range of motion.   Skin:     General: Skin is warm and dry.   Neurological:      General: No focal deficit present.      Mental Status: He is alert.   Psychiatric:         Mood and Affect: Mood normal.         Behavior: Behavior normal.         Thought Content: Thought content normal.         Judgment: Judgment normal.       Assessment/Plan:       89-year-old male patient presents to clinic today to follow-up with the complaint of urinary tract infection symptoms that he was experiencing on 11/16/2022.  He completed a urine drop-off which was negative.  He states that he was having some irritation at the end of his penis but he was also having some urine leakage.  He was not taking his oxybutynin 3 times a day and he started doing that on the 16th.  He states that his symptoms have improved and he is feeling better.  Urinalysis today is negative and bladder scan is 0 mL postvoid. We will also refill his nystatin-triamcinolone ointment.  Instructed to follow-up if symptoms return.  Otherwise he will follow-up for six-month Lupron in January.      Problem List Items Addressed This Visit    None  Visit Diagnoses       Balanitis        Relevant Medications     nystatin-triamcinolone (MYCOLOG) ointment

## 2023-01-19 ENCOUNTER — OFFICE VISIT (OUTPATIENT)
Dept: UROLOGY | Facility: CLINIC | Age: 88
End: 2023-01-19
Payer: MEDICARE

## 2023-01-19 VITALS — WEIGHT: 187 LBS | BODY MASS INDEX: 27.7 KG/M2 | HEIGHT: 69 IN | RESPIRATION RATE: 18 BRPM

## 2023-01-19 DIAGNOSIS — C61 PROSTATE CANCER: Primary | ICD-10-CM

## 2023-01-19 LAB
PSA, DIAGNOSTIC: <0.014 NG/ML (ref 0–4)
TESTOST SERPL-MCNC: <2.5 NG/DL (ref 193–740)

## 2023-01-19 PROCEDURE — 99499 NO LOS: ICD-10-PCS | Mod: S$GLB,,, | Performed by: UROLOGY

## 2023-01-19 PROCEDURE — 96402 CHEMO HORMON ANTINEOPL SQ/IM: CPT | Mod: S$GLB,,, | Performed by: UROLOGY

## 2023-01-19 PROCEDURE — 96402 PR CHEMOTHER HORMON ANTINEOPL SUB-Q/IM: ICD-10-PCS | Mod: S$GLB,,, | Performed by: UROLOGY

## 2023-01-19 PROCEDURE — 99499 UNLISTED E&M SERVICE: CPT | Mod: S$GLB,,, | Performed by: UROLOGY

## 2023-01-19 NOTE — PROGRESS NOTES
Subjective:       Patient ID: Mae Hill Sr. is a 89 y.o. male.    Chief Complaint: 6 mth f/u, prostate cancer, and Lupron due      HPI:  89-year-old male with prostate cancer was being managed with primary Casodex.  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    ATORVASTATIN (LIPITOR) 40 MG TABLET    TAKE 1 TABLET BY MOUTH ONCE DAILY AT BEDTIME    BICALUTAMIDE (CASODEX) 50 MG TAB    Take 1 tablet (50 mg total) by mouth once daily.    BLOOD SUGAR DIAGNOSTIC STRP    OneTouch Ultra Blue Test Strips   use TID as directed    CARVEDILOL (COREG) 6.25 MG TABLET    Take 6.25 mg by mouth 2 (two) times daily.    CLOTRIMAZOLE (LOTRIMIN) 1 % CREAM    APPLY  CREAM TOPICALLY TO AFFECTED AREA TWICE DAILY. MIX 1:1 WITH HYDROCORTISONE CREAM AND APPLY TO AFFECTED AREA FOR 14 DAYS    DONEPEZIL (ARICEPT) 10 MG TABLET    Take 10 mg by mouth every evening.    ELIQUIS 5 MG TAB    Take 5 mg by mouth 2 (two) times daily.    FINASTERIDE (PROSCAR) 5 MG TABLET    Take 5 mg by mouth once daily.    FUROSEMIDE (LASIX) 40 MG TABLET    Take 40 mg by mouth once daily.    GABAPENTIN (NEURONTIN) 300 MG CAPSULE        HYDROCORTISONE 2.5 %  CREAM    Apply topically 2 (two) times daily. Mix 1:1 with clotrimazole cream and apply to affected area. for 14 days    LEVOTHYROXINE (SYNTHROID) 75 MCG TABLET    Take 75 mcg by mouth once daily.    MEMANTINE (NAMENDA) 10 MG TAB    Take 10 mg by mouth 2 (two) times daily.    MUPIROCIN (BACTROBAN) 2 % OINTMENT    APPLY OINTMENT TOPICALLY TO AFFECTED AREA TWICE DAILY    NYSTATIN-TRIAMCINOLONE (MYCOLOG) OINTMENT    Apply topically 2 (two) times daily.    OMEPRAZOLE (PRILOSEC) 40 MG CAPSULE    Take 40 mg by mouth once daily.    ONETOUCH ULTRASOFT LANCETS MISC    OneTouch UltraSoft Lancets   use as directed    OXYBUTYNIN (DITROPAN) 5 MG TAB    Take 1 tablet (5 mg total) by mouth 3 (three) times daily.    TAMSULOSIN (FLOMAX) 0.4 MG CAP    Take 0.4 mg by mouth once daily.    TIZANIDINE (ZANAFLEX) 2 MG TABLET        TRAZODONE (DESYREL) 50 MG TABLET            Past Social History:   Social History     Socioeconomic History    Marital status:    Tobacco Use    Smoking status: Never    Smokeless tobacco: Never   Substance and Sexual Activity    Alcohol use: Not Currently    Drug use: Never       Allergies:   Review of patient's allergies indicates:   Allergen Reactions    Codeine Itching and Nausea And Vomiting        Family History:   Family History   Problem Relation Age of Onset    Heart disease Mother     Heart disease Father         Review of Systems:  Review of Systems   Constitutional:  Negative for activity change and appetite change.   HENT:  Negative for congestion and dental problem.    Eyes:  Negative for visual disturbance.   Respiratory:  Negative for chest tightness and shortness of breath.    Cardiovascular:  Negative for chest pain.   Gastrointestinal:  Negative for abdominal distention and abdominal pain.   Endocrine: Negative for polyuria.   Genitourinary:  Negative for difficulty urinating, dysuria, flank pain, frequency, hematuria, penile discharge, penile pain, penile swelling, scrotal swelling,  testicular pain and urgency.   Musculoskeletal:  Negative for back pain and neck pain.   Skin:  Negative for color change.   Allergic/Immunologic: Positive for immunocompromised state.   Neurological:  Negative for dizziness.   Hematological:  Negative for adenopathy.   Psychiatric/Behavioral:  Negative for agitation, behavioral problems and confusion.      Physical Exam:  Physical Exam  Constitutional:       General: He is not in acute distress.     Appearance: He is well-developed.   HENT:      Head: Normocephalic and atraumatic.      Nose: Nose normal.   Eyes:      General: No scleral icterus.     Conjunctiva/sclera: Conjunctivae normal.      Pupils: Pupils are equal, round, and reactive to light.   Neck:      Thyroid: No thyromegaly.      Trachea: No tracheal deviation.   Cardiovascular:      Rate and Rhythm: Normal rate and regular rhythm.      Heart sounds: Normal heart sounds.   Pulmonary:      Effort: Pulmonary effort is normal. No respiratory distress.      Breath sounds: Normal breath sounds. No wheezing or rales.   Abdominal:      General: Bowel sounds are normal. There is no distension.      Palpations: Abdomen is soft.      Tenderness: There is no abdominal tenderness. There is no guarding or rebound.   Genitourinary:     Penis: Normal. No tenderness.       Prostate: Normal.   Musculoskeletal:         General: No deformity. Normal range of motion.      Cervical back: Neck supple.   Lymphadenopathy:      Cervical: No cervical adenopathy.   Skin:     General: Skin is warm and dry.      Findings: No erythema or rash.   Neurological:      Mental Status: He is alert and oriented to person, place, and time.      Cranial Nerves: No cranial nerve deficit.   Psychiatric:         Behavior: Behavior normal.       Assessment/Plan:       Problem List Items Addressed This Visit    None  Visit Diagnoses       Prostate cancer    -  Primary    Relevant Orders    Prostate Specific Antigen, Diagnostic    Testosterone                Prostate cancer:  Patient will get his Lupron primary hormonal therapy today return to clinic in 6 months for Lupron

## 2023-04-20 ENCOUNTER — TELEPHONE (OUTPATIENT)
Dept: UROLOGY | Facility: CLINIC | Age: 88
End: 2023-04-20
Payer: MEDICARE

## 2023-04-20 NOTE — TELEPHONE ENCOUNTER
Pt calling with left sided pain. I have scheduled pt to see Dr. Rosado next Friday at 9:50.    ----- Message from Latha Yen sent at 4/20/2023  9:23 AM CDT -----  Type:  Sooner Apoointment Request    Caller is requesting a sooner appointment.  Caller declined first available appointment listed below.  Caller will not accept being placed on the waitlist and is requesting a message be sent to doctor.  Name of Caller: Mae Hill  When is the first available appointment?6/13  Symptoms: Pain in left sind  Would the patient rather a call back or a response via [employer]?  Call back  Best Call Back Number: [phone]  Additional Information:

## 2023-04-28 ENCOUNTER — OFFICE VISIT (OUTPATIENT)
Dept: UROLOGY | Facility: CLINIC | Age: 88
End: 2023-04-28
Payer: MEDICARE

## 2023-04-28 VITALS
DIASTOLIC BLOOD PRESSURE: 58 MMHG | BODY MASS INDEX: 27.7 KG/M2 | WEIGHT: 187 LBS | HEART RATE: 85 BPM | HEIGHT: 69 IN | SYSTOLIC BLOOD PRESSURE: 101 MMHG

## 2023-04-28 DIAGNOSIS — N30.00 ACUTE CYSTITIS WITHOUT HEMATURIA: ICD-10-CM

## 2023-04-28 DIAGNOSIS — C61 PROSTATE CANCER: Primary | ICD-10-CM

## 2023-04-28 DIAGNOSIS — N50.812 TESTICULAR PAIN, LEFT: ICD-10-CM

## 2023-04-28 PROCEDURE — 99214 OFFICE O/P EST MOD 30 MIN: CPT | Mod: S$GLB,,, | Performed by: UROLOGY

## 2023-04-28 PROCEDURE — 99214 PR OFFICE/OUTPT VISIT, EST, LEVL IV, 30-39 MIN: ICD-10-PCS | Mod: S$GLB,,, | Performed by: UROLOGY

## 2023-04-28 RX ORDER — CIPROFLOXACIN 500 MG/1
500 TABLET ORAL 2 TIMES DAILY
Qty: 20 TABLET | Refills: 0 | Status: SHIPPED | OUTPATIENT
Start: 2023-04-28 | End: 2023-05-08

## 2023-04-28 NOTE — PROGRESS NOTES
Subjective:       Patient ID: Mae Hill Sr. is a 89 y.o. male.    Chief Complaint: Prostate Cancer      HPI:  89-year-old male with urinary urgency and frequency as well as a history of prostate cancer that was diagnosed prior to 2019 he was initially managed with Casodex alone had breast tenderness and a rise in his PSA was switched to Lupron primary hormonal therapy has a sister who lives currently is alive at 100 years old    Past Medical History:   Past Medical History:   Diagnosis Date    Anxiety     Balanitis     Chronic renal insufficiency, stage III (moderate)     COPD (chronic obstructive pulmonary disease)     Depression     Heart failure     L    High cholesterol     History of BPH     Hypertension     Prostate CA     Vascular dementia        Past Surgical Historical:   Past Surgical History:   Procedure Laterality Date    ABOVE-KNEE AMPUTATION Right 10/13/2021    with VAC    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    ATORVASTATIN (LIPITOR) 40 MG TABLET    TAKE 1 TABLET BY MOUTH ONCE DAILY AT BEDTIME    BICALUTAMIDE (CASODEX) 50 MG TAB    Take 1 tablet (50 mg total) by mouth once daily.    BLOOD SUGAR DIAGNOSTIC STRP    OneTouch Ultra Blue Test Strips   use TID as directed    CARVEDILOL (COREG) 6.25 MG TABLET    Take 6.25 mg by mouth 2 (two) times daily.    CLOTRIMAZOLE (LOTRIMIN) 1 % CREAM    APPLY  CREAM TOPICALLY TO AFFECTED AREA TWICE DAILY. MIX 1:1 WITH HYDROCORTISONE CREAM AND APPLY TO AFFECTED AREA FOR 14 DAYS    DONEPEZIL (ARICEPT) 10 MG TABLET    Take 10 mg by mouth every evening.    ELIQUIS 5 MG TAB    Take 5 mg by mouth 2 (two) times daily.    FINASTERIDE (PROSCAR) 5 MG TABLET    Take 5 mg by mouth once daily.     FUROSEMIDE (LASIX) 40 MG TABLET    Take 40 mg by mouth once daily.    LEVOTHYROXINE (SYNTHROID) 75 MCG TABLET    Take 75 mcg by mouth once daily.    MEMANTINE (NAMENDA) 10 MG TAB    Take 10 mg by mouth 2 (two) times daily.    MUPIROCIN (BACTROBAN) 2 % OINTMENT    APPLY OINTMENT TOPICALLY TO AFFECTED AREA TWICE DAILY    NYSTATIN-TRIAMCINOLONE (MYCOLOG) OINTMENT    Apply topically 2 (two) times daily.    ONETOUCH ULTRASOFT LANCETS MISC    OneTouch UltraSoft Lancets   use as directed    OXYBUTYNIN (DITROPAN) 5 MG TAB    Take 1 tablet (5 mg total) by mouth 3 (three) times daily.    TAMSULOSIN (FLOMAX) 0.4 MG CAP    Take 0.4 mg by mouth once daily.   Discontinued Medications    GABAPENTIN (NEURONTIN) 300 MG CAPSULE        HYDROCORTISONE 2.5 % CREAM    Apply topically 2 (two) times daily. Mix 1:1 with clotrimazole cream and apply to affected area. for 14 days    OMEPRAZOLE (PRILOSEC) 40 MG CAPSULE    Take 40 mg by mouth once daily.    TIZANIDINE (ZANAFLEX) 2 MG TABLET        TRAZODONE (DESYREL) 50 MG TABLET            Past Social History:   Social History     Socioeconomic History    Marital status:    Tobacco Use    Smoking status: Never    Smokeless tobacco: Never   Substance and Sexual Activity    Alcohol use: Not Currently    Drug use: Never       Allergies:   Review of patient's allergies indicates:   Allergen Reactions    Codeine Itching and Nausea And Vomiting        Family History:   Family History   Problem Relation Age of Onset    Heart disease Mother     Heart disease Father         Review of Systems:  Review of Systems   Constitutional:  Negative for activity change and appetite change.   HENT:  Negative for congestion and dental problem.    Eyes:  Negative for visual disturbance.   Respiratory:  Negative for chest tightness and shortness of breath.    Cardiovascular:  Negative for chest pain.   Gastrointestinal:  Negative for abdominal distention and abdominal pain.   Endocrine: Negative for polyuria.    Genitourinary:  Negative for difficulty urinating, dysuria, flank pain, frequency, hematuria, penile discharge, penile pain, penile swelling, scrotal swelling, testicular pain and urgency.   Musculoskeletal:  Negative for back pain and neck pain.   Skin:  Negative for color change.   Allergic/Immunologic: Positive for immunocompromised state.   Neurological:  Negative for dizziness.   Hematological:  Negative for adenopathy.   Psychiatric/Behavioral:  Negative for agitation, behavioral problems and confusion.      Physical Exam:  Physical Exam  Constitutional:       General: He is not in acute distress.     Appearance: He is well-developed.   HENT:      Head: Normocephalic and atraumatic.      Nose: Nose normal.   Eyes:      General: No scleral icterus.     Conjunctiva/sclera: Conjunctivae normal.      Pupils: Pupils are equal, round, and reactive to light.   Neck:      Thyroid: No thyromegaly.      Trachea: No tracheal deviation.   Cardiovascular:      Rate and Rhythm: Normal rate and regular rhythm.      Heart sounds: Normal heart sounds.   Pulmonary:      Effort: Pulmonary effort is normal. No respiratory distress.      Breath sounds: Normal breath sounds. No wheezing or rales.   Abdominal:      General: Bowel sounds are normal. There is no distension.      Palpations: Abdomen is soft.      Tenderness: There is no abdominal tenderness. There is no guarding or rebound.   Genitourinary:     Penis: Normal. No tenderness.       Prostate: Normal.   Musculoskeletal:         General: No deformity. Normal range of motion.      Cervical back: Neck supple.   Lymphadenopathy:      Cervical: No cervical adenopathy.   Skin:     General: Skin is warm and dry.      Findings: No erythema or rash.   Neurological:      Mental Status: He is alert and oriented to person, place, and time.      Cranial Nerves: No cranial nerve deficit.   Psychiatric:         Behavior: Behavior normal.       Assessment/Plan:       Problem List Items  Addressed This Visit    None  Visit Diagnoses       Prostate cancer    -  Primary    Testicular pain, left                   Acute cystitis:  Patient appears to have UTI will culture his urine treated empirically     Prostate cancer:  Patient is on primary hormonal therapy after run of Casodex alone.  Patient is due for Lupron in July return to clinic at that time we will give him Lupron

## 2023-04-30 LAB
PROTEUS MIRABILIS: NORMAL
URINE CULTURE, ROUTINE: NORMAL

## 2023-05-02 ENCOUNTER — TELEPHONE (OUTPATIENT)
Dept: UROLOGY | Facility: CLINIC | Age: 88
End: 2023-05-02
Payer: MEDICARE

## 2023-05-02 NOTE — TELEPHONE ENCOUNTER
Contacted, spoke with spouse, advised of results. VERBALIZED UNDERSTANDING. [place]    ----- Message from Monisha Wagoner NP sent at 5/2/2023  2:52 PM CDT -----  Please notify patient of results and instruct him to complete antibiotic prescribed.             Culture Urine  Final                                                                SWP       Organism 1                     Proteus mirabilis                                      >=100,000 CFU/mL                                      Sensitivity completed.         1. Proteus mirabilis                                         SUKHJINDER     INTERP    COST                                         ---------  -------  ------         Ampicillin                        <=2        S       1            Ampicillin/Sulbactam              <=2        S       1            Cefazolin                         <=4        S       1            Cefepime                        <=0.12       S       1            Ceftriaxone                     <=0.25       S       1            Tobramycin                        <=1        S       1            Ciprofloxacin                   <=0.25       S       2            Gentamicin                        <=1        S       2            Levofloxacin                    <=0.12       S       2            Piperacillin/Tazobactam           <=4        S       2            Trimet/Sulfa                     <=20        S       2            Meropenem                       <=0.25       S       3            Cefoxitin                         <=4        S                    Nitrofurantoin                    128        R       1

## 2023-07-19 ENCOUNTER — CLINICAL SUPPORT (OUTPATIENT)
Dept: UROLOGY | Facility: CLINIC | Age: 88
End: 2023-07-19
Payer: MEDICARE

## 2023-07-19 DIAGNOSIS — C61 PROSTATE CANCER: Primary | ICD-10-CM

## 2023-07-19 LAB — PSA, DIAGNOSTIC: < 0.01 NG/ML (ref 0.1–4)

## 2023-07-19 PROCEDURE — 36415 PR COLLECTION VENOUS BLOOD,VENIPUNCTURE: ICD-10-PCS | Mod: S$GLB,,, | Performed by: UROLOGY

## 2023-07-19 PROCEDURE — 36415 COLL VENOUS BLD VENIPUNCTURE: CPT | Mod: S$GLB,,, | Performed by: UROLOGY

## 2023-07-25 ENCOUNTER — OFFICE VISIT (OUTPATIENT)
Dept: UROLOGY | Facility: CLINIC | Age: 88
End: 2023-07-25
Payer: MEDICARE

## 2023-07-25 VITALS — DIASTOLIC BLOOD PRESSURE: 58 MMHG | TEMPERATURE: 99 F | HEART RATE: 85 BPM | SYSTOLIC BLOOD PRESSURE: 101 MMHG

## 2023-07-25 DIAGNOSIS — Z79.818 ANDROGEN DEPRIVATION THERAPY: ICD-10-CM

## 2023-07-25 DIAGNOSIS — C61 PROSTATE CANCER: Primary | ICD-10-CM

## 2023-07-25 PROCEDURE — 96402 CHEMO HORMON ANTINEOPL SQ/IM: CPT | Mod: S$GLB,,, | Performed by: UROLOGY

## 2023-07-25 PROCEDURE — 96402 PR CHEMOTHER HORMON ANTINEOPL SUB-Q/IM: ICD-10-PCS | Mod: S$GLB,,, | Performed by: UROLOGY

## 2023-07-25 PROCEDURE — 99499 NO LOS: ICD-10-PCS | Mod: S$GLB,,, | Performed by: UROLOGY

## 2023-07-25 PROCEDURE — 99499 UNLISTED E&M SERVICE: CPT | Mod: S$GLB,,, | Performed by: UROLOGY

## 2023-07-25 RX ORDER — LEVOTHYROXINE SODIUM 50 UG/1
50 TABLET ORAL EVERY MORNING
COMMUNITY
Start: 2023-07-06

## 2023-07-25 RX ORDER — WARFARIN SODIUM 5 MG/1
5 TABLET ORAL
COMMUNITY
Start: 2023-07-11

## 2023-07-25 NOTE — PROGRESS NOTES
Subjective:       Patient ID: Mae Hill Sr. is a 89 y.o. male.    Chief Complaint: 6 month lupron      HPI: 89-year-old male patient with a history of prostate cancer that was diagnosed prior to 2019.  He was initially managed with Casodex but had breast tenderness and a rise in his PSA.  He was switched to Lupron primary hormonal therapy.  Doing well with no complaints at this time.  His most recent PSA on 07/19/2023 was undetectable.       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    ATORVASTATIN (LIPITOR) 40 MG TABLET    TAKE 1 TABLET BY MOUTH ONCE DAILY AT BEDTIME    BICALUTAMIDE (CASODEX) 50 MG TAB    Take 1 tablet (50 mg total) by mouth once daily.    BLOOD SUGAR DIAGNOSTIC STRP    OneTouch Ultra Blue Test Strips   use TID as directed    CARVEDILOL (COREG) 6.25 MG TABLET    Take 6.25 mg by mouth 2 (two) times daily.    CLOTRIMAZOLE (LOTRIMIN) 1 % CREAM    APPLY  CREAM TOPICALLY TO AFFECTED AREA TWICE DAILY. MIX 1:1 WITH HYDROCORTISONE CREAM AND APPLY TO AFFECTED AREA FOR 14 DAYS    DONEPEZIL (ARICEPT) 10 MG TABLET    Take 10 mg by mouth every evening.    ELIQUIS 5 MG TAB    Take 5 mg by mouth 2 (two) times daily.    FINASTERIDE (PROSCAR) 5 MG TABLET    Take 5 mg by mouth  once daily.    FUROSEMIDE (LASIX) 40 MG TABLET    Take 40 mg by mouth once daily.    LEVOTHYROXINE (SYNTHROID) 50 MCG TABLET    Take 50 mcg by mouth every morning. take on an empty stomach    MEMANTINE (NAMENDA) 10 MG TAB    Take 10 mg by mouth 2 (two) times daily.    MUPIROCIN (BACTROBAN) 2 % OINTMENT    APPLY OINTMENT TOPICALLY TO AFFECTED AREA TWICE DAILY    NYSTATIN-TRIAMCINOLONE (MYCOLOG) OINTMENT    Apply topically 2 (two) times daily.    ONETOUCH ULTRASOFT LANCETS MISC    OneTouch UltraSoft Lancets   use as directed    OXYBUTYNIN (DITROPAN) 5 MG TAB    Take 1 tablet (5 mg total) by mouth 3 (three) times daily.    TAMSULOSIN (FLOMAX) 0.4 MG CAP    Take 0.4 mg by mouth once daily.    WARFARIN (COUMADIN) 5 MG TABLET    Take 5 mg by mouth.   Discontinued Medications    LEVOTHYROXINE (SYNTHROID) 75 MCG TABLET    Take 75 mcg by mouth once daily.        Past Social History:   Social History     Socioeconomic History    Marital status:    Tobacco Use    Smoking status: Never    Smokeless tobacco: Never   Substance and Sexual Activity    Alcohol use: Not Currently    Drug use: Never       Allergies:   Review of patient's allergies indicates:   Allergen Reactions    Codeine Itching and Nausea And Vomiting        Family History:   Family History   Problem Relation Age of Onset    Heart disease Mother     Heart disease Father         Review of Systems:  Review of Systems   Constitutional: Negative.    HENT: Negative.     Eyes: Negative.    Respiratory: Negative.     Cardiovascular: Negative.    Gastrointestinal: Negative.    Endocrine: Negative.    Genitourinary: Negative.    Musculoskeletal: Negative.    Skin: Negative.    Allergic/Immunologic: Negative.    Neurological: Negative.    Hematological: Negative.    Psychiatric/Behavioral: Negative.       Physical Exam:  Physical Exam  Constitutional:       Appearance: He is normal weight.   HENT:      Head: Normocephalic.      Nose: Nose normal.      Mouth/Throat:       Mouth: Mucous membranes are moist.      Pharynx: Oropharynx is clear.   Eyes:      Extraocular Movements: Extraocular movements intact.      Conjunctiva/sclera: Conjunctivae normal.      Pupils: Pupils are equal, round, and reactive to light.   Cardiovascular:      Rate and Rhythm: Normal rate and regular rhythm.      Pulses: Normal pulses.      Heart sounds: Normal heart sounds.   Pulmonary:      Effort: Pulmonary effort is normal.      Breath sounds: Normal breath sounds.   Abdominal:      General: Abdomen is flat. Bowel sounds are normal.      Palpations: Abdomen is soft.      Hernia: There is no hernia in the right inguinal area or left inguinal area.   Genitourinary:     Penis: Normal. No phimosis, paraphimosis, hypospadias, erythema, tenderness or discharge.       Testes: Normal.         Right: Mass, tenderness or swelling not present. Right testis is descended. Cremasteric reflex is present.          Left: Mass, tenderness or swelling not present. Left testis is descended. Cremasteric reflex is present.       Prostate: Normal.      Rectum: Normal.   Musculoskeletal:         General: Normal range of motion.      Cervical back: Normal range of motion and neck supple.   Lymphadenopathy:      Lower Body: No right inguinal adenopathy. No left inguinal adenopathy.   Skin:     General: Skin is warm and dry.      Capillary Refill: Capillary refill takes less than 2 seconds.   Neurological:      General: No focal deficit present.      Mental Status: He is alert and oriented to person, place, and time. Mental status is at baseline.   Psychiatric:         Mood and Affect: Mood normal.         Behavior: Behavior normal.         Thought Content: Thought content normal.         Judgment: Judgment normal.       Assessment/Plan:     Prostate cancer:  Patient is on primary hormonal therapy.  Patient is due for Lupron today.  We will administer Lupron and have him follow up in 6 months with a PSA.    IDr. Ritchie  have seen and personally evaluated the patient. I have formulated the plan reviewed all pertinent imaging and clinical data.  I agree with the nurse practitioner's assessment, and I have personally formulated the plan for this patient's care as described by the midlevel.   Problem List Items Addressed This Visit    None

## 2023-09-14 ENCOUNTER — CLINICAL SUPPORT (OUTPATIENT)
Dept: UROLOGY | Facility: CLINIC | Age: 88
End: 2023-09-14
Payer: MEDICARE

## 2023-09-14 DIAGNOSIS — R30.0 DYSURIA: Primary | ICD-10-CM

## 2023-09-14 LAB
BILIRUBIN, UA POC OHS: NEGATIVE
BLOOD, UA POC OHS: NEGATIVE
CLARITY, UA POC OHS: CLEAR
COLOR, UA POC OHS: YELLOW
GLUCOSE, UA POC OHS: NEGATIVE
KETONES, UA POC OHS: ABNORMAL
LEUKOCYTES, UA POC OHS: ABNORMAL
NITRITE, UA POC OHS: NEGATIVE
PH, UA POC OHS: 5.5
PROTEIN, UA POC OHS: NEGATIVE
SPECIFIC GRAVITY, UA POC OHS: 1.01
UROBILINOGEN, UA POC OHS: 1

## 2023-09-14 PROCEDURE — 81003 POCT URINALYSIS(INSTRUMENT): ICD-10-PCS | Mod: QW,S$GLB,, | Performed by: UROLOGY

## 2023-09-14 PROCEDURE — 81003 URINALYSIS AUTO W/O SCOPE: CPT | Mod: QW,S$GLB,, | Performed by: UROLOGY

## 2023-09-15 LAB — URINE CULTURE, ROUTINE: NORMAL

## 2023-09-22 ENCOUNTER — TELEPHONE (OUTPATIENT)
Dept: UROLOGY | Facility: CLINIC | Age: 88
End: 2023-09-22
Payer: MEDICARE

## 2023-09-22 NOTE — TELEPHONE ENCOUNTER
Contacted, scheduled. [facility]    ----- Message from Sayda Hill sent at 9/22/2023  2:04 PM CDT -----  Contact: self  Type: Staff Message  Caller: Mae Hill Sr.  Call Back Number:  [phone]  Nature of the Call: Patient left testical hurt to touch.  Additional Information: na

## 2023-09-25 ENCOUNTER — OFFICE VISIT (OUTPATIENT)
Dept: UROLOGY | Facility: CLINIC | Age: 88
End: 2023-09-25
Payer: MEDICARE

## 2023-09-25 VITALS
SYSTOLIC BLOOD PRESSURE: 108 MMHG | DIASTOLIC BLOOD PRESSURE: 61 MMHG | HEIGHT: 69 IN | OXYGEN SATURATION: 94 % | BODY MASS INDEX: 27.7 KG/M2 | TEMPERATURE: 98 F | HEART RATE: 107 BPM | WEIGHT: 187 LBS

## 2023-09-25 DIAGNOSIS — N50.812 TESTICULAR PAIN, LEFT: Primary | ICD-10-CM

## 2023-09-25 PROCEDURE — 99214 PR OFFICE/OUTPT VISIT, EST, LEVL IV, 30-39 MIN: ICD-10-PCS | Mod: S$GLB,,, | Performed by: FAMILY MEDICINE

## 2023-09-25 PROCEDURE — 99214 OFFICE O/P EST MOD 30 MIN: CPT | Mod: S$GLB,,, | Performed by: FAMILY MEDICINE

## 2023-09-25 NOTE — PROGRESS NOTES
Subjective:       Patient ID: Mae Hill Sr. is a 89 y.o. male.    Chief Complaint: Testicle Pain      HPI: 89-year-old male patient presenting to the clinic with a complaint of left testicular pain.  Patient states that the discomfort is mostly with palpation.  He states he noticed the discomfort last week.  He denies any swelling.    Testicle Pain  The patient's primary symptoms include testicular pain.      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    ATORVASTATIN (LIPITOR) 40 MG TABLET    TAKE 1 TABLET BY MOUTH ONCE DAILY AT BEDTIME    BICALUTAMIDE (CASODEX) 50 MG TAB    Take 1 tablet (50 mg total) by mouth once daily.    BLOOD SUGAR DIAGNOSTIC STRP    OneTouch Ultra Blue Test Strips   use TID as directed    CARVEDILOL (COREG) 6.25 MG TABLET    Take 6.25 mg by mouth 2 (two) times daily.    CLOTRIMAZOLE (LOTRIMIN) 1 % CREAM    APPLY  CREAM TOPICALLY TO AFFECTED AREA TWICE DAILY. MIX 1:1 WITH HYDROCORTISONE CREAM AND APPLY TO AFFECTED AREA FOR 14 DAYS    DONEPEZIL (ARICEPT) 10 MG TABLET    Take 10 mg by mouth every evening.    ELIQUIS 5 MG TAB    Take 5 mg by mouth 2 (two) times daily.    FINASTERIDE (PROSCAR) 5 MG TABLET    Take 5 mg by mouth once daily.    FUROSEMIDE (LASIX) 40  MG TABLET    Take 40 mg by mouth once daily.    LEVOTHYROXINE (SYNTHROID) 50 MCG TABLET    Take 50 mcg by mouth every morning. take on an empty stomach    MEMANTINE (NAMENDA) 10 MG TAB    Take 10 mg by mouth 2 (two) times daily.    MUPIROCIN (BACTROBAN) 2 % OINTMENT    APPLY OINTMENT TOPICALLY TO AFFECTED AREA TWICE DAILY    NYSTATIN-TRIAMCINOLONE (MYCOLOG) OINTMENT    Apply topically 2 (two) times daily.    ONETOUCH ULTRASOFT LANCETS MISC    OneTouch UltraSoft Lancets   use as directed    OXYBUTYNIN (DITROPAN) 5 MG TAB    Take 1 tablet (5 mg total) by mouth 3 (three) times daily.    TAMSULOSIN (FLOMAX) 0.4 MG CAP    Take 0.4 mg by mouth once daily.    WARFARIN (COUMADIN) 5 MG TABLET    Take 5 mg by mouth.        Past Social History:   Social History     Socioeconomic History    Marital status:    Tobacco Use    Smoking status: Never    Smokeless tobacco: Never   Substance and Sexual Activity    Alcohol use: Not Currently    Drug use: Never     Social Determinants of Health     Stress: No Stress Concern Present (10/11/2019)    [facility] of Occupational Health - Occupational Stress Questionnaire     Feeling of Stress : Not at all       Allergies:   Review of patient's allergies indicates:   Allergen Reactions    Codeine Itching and Nausea And Vomiting        Family History:   Family History   Problem Relation Age of Onset    Heart disease Mother     Heart disease Father         Review of Systems:  Review of Systems   Constitutional: Negative.    HENT: Negative.     Eyes: Negative.    Respiratory: Negative.     Cardiovascular: Negative.    Gastrointestinal: Negative.    Endocrine: Negative.    Genitourinary:  Positive for testicular pain.   Musculoskeletal: Negative.    Skin: Negative.    Allergic/Immunologic: Negative.    Neurological: Negative.    Hematological: Negative.    Psychiatric/Behavioral: Negative.         Physical Exam:  Physical Exam  Constitutional:       Appearance: He is normal weight.    HENT:      Head: Normocephalic.      Nose: Nose normal.      Mouth/Throat:      Mouth: Mucous membranes are moist.      Pharynx: Oropharynx is clear.   Eyes:      Extraocular Movements: Extraocular movements intact.      Conjunctiva/sclera: Conjunctivae normal.      Pupils: Pupils are equal, round, and reactive to light.   Cardiovascular:      Rate and Rhythm: Normal rate and regular rhythm.      Pulses: Normal pulses.      Heart sounds: Normal heart sounds.   Pulmonary:      Effort: Pulmonary effort is normal.      Breath sounds: Normal breath sounds.   Abdominal:      General: Abdomen is flat. Bowel sounds are normal.      Palpations: Abdomen is soft.      Hernia: There is no hernia in the right inguinal area or left inguinal area.   Genitourinary:     Penis: Normal. No phimosis, paraphimosis, hypospadias, erythema, tenderness or discharge.       Testes:         Right: Mass, tenderness or swelling not present. Right testis is descended. Cremasteric reflex is present.          Left: Tenderness present. Mass or swelling not present. Left testis is descended. Cremasteric reflex is present.       Prostate: Normal.      Rectum: Normal.   Musculoskeletal:         General: Normal range of motion.      Cervical back: Normal range of motion and neck supple.   Lymphadenopathy:      Lower Body: No right inguinal adenopathy. No left inguinal adenopathy.   Skin:     General: Skin is warm and dry.      Capillary Refill: Capillary refill takes less than 2 seconds.   Neurological:      General: No focal deficit present.      Mental Status: He is alert and oriented to person, place, and time. Mental status is at baseline.   Psychiatric:         Mood and Affect: Mood normal.         Behavior: Behavior normal.         Thought Content: Thought content normal.         Judgment: Judgment normal.         Assessment/Plan:     Patient has some mild discomfort in the left testicle with palpation.  We will order an ultrasound for further  evaluation.   Problem List Items Addressed This Visit    None  Visit Diagnoses       Testicular pain, left    -  Primary    Relevant Orders    US Scrotum And Testicles

## 2023-09-27 ENCOUNTER — TELEPHONE (OUTPATIENT)
Dept: UROLOGY | Facility: CLINIC | Age: 88
End: 2023-09-27
Payer: MEDICARE

## 2023-09-27 NOTE — TELEPHONE ENCOUNTER
Called and spoke to patient, to let him know we called in some refills on his nystatin cream.      ----- Message from Magnolia Bella sent at 9/27/2023 11:01 AM CDT -----  Contact: Mae Jaime is calling in regards to needing a refill on  nystatin-triamcinolone (MYCOLOG) ointment.please call back at [phone]          [facility] [address] [place] [address]  Phone: [phone] Fax: [phone]                      Thanks  PETTY

## 2023-10-05 DIAGNOSIS — N50.812 TESTICULAR PAIN, LEFT: Primary | ICD-10-CM

## 2023-10-05 RX ORDER — CEFDINIR 300 MG/1
300 CAPSULE ORAL 2 TIMES DAILY
Qty: 28 CAPSULE | Refills: 0 | Status: SHIPPED | OUTPATIENT
Start: 2023-10-05 | End: 2023-10-19

## 2023-10-12 ENCOUNTER — TELEPHONE (OUTPATIENT)
Dept: UROLOGY | Facility: CLINIC | Age: 88
End: 2023-10-12
Payer: MEDICARE

## 2023-10-12 NOTE — TELEPHONE ENCOUNTER
Pt states he has not been contacted regarding u/s we will resend to hospital.     ----- Message from Lilo Mclaughlin sent at 10/12/2023  4:53 PM CDT -----  Contact: self  Pt wife is calling in regards to pt not having ultra sound orders in . Please call pt at [phone]

## 2023-10-12 NOTE — TELEPHONE ENCOUNTER
Spoke with wife regarding seeing if pt had ultrasound done. Wife states he is out on an errand and will give us a call back.     ----- Message from Monisha Wagoner NP sent at 10/12/2023  8:04 AM CDT -----  Contact: Mae    ----- Message -----  From: Marbella Lassiter LPN  Sent: 9/26/2023   4:47 PM CDT  To: Monisha Wagoner NP      ----- Message -----  From: Nhung Motta LPN  Sent: 9/26/2023   1:43 PM CDT  To: [facility] Clinical Support Staff      ----- Message -----  From: Rosa Maria Estrella  Sent: 9/26/2023   1:29 PM CDT  To: Ciaran Bearden Staff    Mae is calling regards to recent visit he stated that he was told that something was going to be called in for him for some antibiotics but when he went to the pharmacy to  there was nothing there. Please call him at [phone].      [facility] [address]  Phone: [phone] Fax: [phone]    Thanks  Td

## 2023-10-24 ENCOUNTER — TELEPHONE (OUTPATIENT)
Dept: PAIN MEDICINE | Facility: CLINIC | Age: 88
End: 2023-10-24
Payer: MEDICARE

## 2023-10-24 NOTE — TELEPHONE ENCOUNTER
----- Message from Lyric Navarro sent at 10/24/2023 10:03 AM CDT -----  Contact: Cinthia(wife)  Cinthia called to consult with nurse or staff regarding the patient coming in for an injection. She states a referral was sent in for the patient and wanted to check on the status of this. She states the patient has had an MRI and Dr. Quijano was to review. She would like a call back and can be reached [phone]. Thanks/MR

## 2023-10-24 NOTE — TELEPHONE ENCOUNTER
I do not see a referral for this patient, but I did call his wife and let her know that we are not in network with [employer].

## 2023-11-22 DIAGNOSIS — N13.8 BPH WITH OBSTRUCTION/LOWER URINARY TRACT SYMPTOMS: Primary | ICD-10-CM

## 2023-11-22 DIAGNOSIS — R30.0 DYSURIA: ICD-10-CM

## 2023-11-22 DIAGNOSIS — N40.1 BPH WITH OBSTRUCTION/LOWER URINARY TRACT SYMPTOMS: Primary | ICD-10-CM

## 2023-11-27 RX ORDER — OXYBUTYNIN CHLORIDE 5 MG/1
5 TABLET ORAL 3 TIMES DAILY
Qty: 30 TABLET | Refills: 11 | Status: SHIPPED | OUTPATIENT
Start: 2023-11-27

## 2024-01-29 ENCOUNTER — TELEPHONE (OUTPATIENT)
Dept: UROLOGY | Facility: CLINIC | Age: 89
End: 2024-01-29

## 2024-01-29 ENCOUNTER — CLINICAL SUPPORT (OUTPATIENT)
Dept: UROLOGY | Facility: CLINIC | Age: 89
End: 2024-01-29
Payer: MEDICARE

## 2024-01-29 DIAGNOSIS — C61 PROSTATE CANCER: Primary | ICD-10-CM

## 2024-01-29 PROCEDURE — 36415 COLL VENOUS BLD VENIPUNCTURE: CPT | Mod: S$GLB,,, | Performed by: UROLOGY

## 2024-01-29 NOTE — TELEPHONE ENCOUNTER
Returned request for call back. Patient stated that he got confused on where he was supposed to go & what his appointment was for & would like to know if he could come in later today. Informed patient that would be fine & that his appointment was to draw a PSA level. He stated that he has another appointment at 1 PM with his primary MD & would like to come in after that appointment is done. Instructed patient that would be fine. Verbalized understanding.                         ----- Message from Latha Yen sent at 1/29/2024 10:56 AM CST -----  Patient is calling in regards to schedule please call him back at [phone] (home) .            Thanks  love

## 2024-01-29 NOTE — PROGRESS NOTES
PSA collected from right hand with butterfly needle using aseptic technique. Patient tolerated well.

## 2024-01-30 LAB — PSA, DIAGNOSTIC: < 0.01 NG/ML (ref 0.1–4)

## 2024-02-06 ENCOUNTER — OFFICE VISIT (OUTPATIENT)
Dept: UROLOGY | Facility: CLINIC | Age: 89
End: 2024-02-06
Payer: MEDICARE

## 2024-02-06 VITALS
BODY MASS INDEX: 27.7 KG/M2 | HEIGHT: 69 IN | SYSTOLIC BLOOD PRESSURE: 110 MMHG | HEART RATE: 58 BPM | DIASTOLIC BLOOD PRESSURE: 60 MMHG | WEIGHT: 187 LBS | RESPIRATION RATE: 19 BRPM

## 2024-02-06 DIAGNOSIS — C61 PROSTATE CANCER: Primary | ICD-10-CM

## 2024-02-06 PROCEDURE — 99499 UNLISTED E&M SERVICE: CPT | Mod: S$GLB,,, | Performed by: UROLOGY

## 2024-02-06 PROCEDURE — 96402 CHEMO HORMON ANTINEOPL SQ/IM: CPT | Mod: S$GLB,,, | Performed by: UROLOGY

## 2024-02-06 NOTE — PROGRESS NOTES
Using aseptic technique, lupron given to right. Pt advised to sit in lobby for 15-30 minutes to monitor for adverse reaction.

## 2024-02-06 NOTE — PROGRESS NOTES
Subjective:       Patient ID: Mae Hill Sr. is a 89 y.o. male.    Chief Complaint: 6 month psa      HPI: 89-year-old male patient with a history of prostate cancer that was diagnosed prior to 2019.  He was initially managed with Casodex but had breast tenderness and a rise in his PSA.  He was switched to Lupron primary hormonal therapy.  Doing well with no complaints at this time.  His most recent PSA was undetectable.         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    ATORVASTATIN (LIPITOR) 40 MG TABLET    TAKE 1 TABLET BY MOUTH ONCE DAILY AT BEDTIME    BICALUTAMIDE (CASODEX) 50 MG TAB    Take 1 tablet (50 mg total) by mouth once daily.    BLOOD SUGAR DIAGNOSTIC STRP    OneTouch Ultra Blue Test Strips   use TID as directed    CARVEDILOL (COREG) 6.25 MG TABLET    Take 6.25 mg by mouth 2 (two) times daily.    CLOTRIMAZOLE (LOTRIMIN) 1 % CREAM    APPLY  CREAM TOPICALLY TO AFFECTED AREA TWICE DAILY. MIX 1:1 WITH HYDROCORTISONE CREAM AND APPLY TO AFFECTED AREA FOR 14 DAYS    DONEPEZIL (ARICEPT) 10 MG TABLET    Take 10 mg by mouth every evening.    ELIQUIS 5 MG TAB    Take 5 mg by mouth 2 (two) times daily.    FINASTERIDE (PROSCAR) 5 MG TABLET    Take 5 mg by mouth once daily.     FUROSEMIDE (LASIX) 40 MG TABLET    Take 40 mg by mouth once daily.    LEVOTHYROXINE (SYNTHROID) 50 MCG TABLET    Take 50 mcg by mouth every morning. take on an empty stomach    MEMANTINE (NAMENDA) 10 MG TAB    Take 10 mg by mouth 2 (two) times daily.    MUPIROCIN (BACTROBAN) 2 % OINTMENT    APPLY OINTMENT TOPICALLY TO AFFECTED AREA TWICE DAILY    NYSTATIN-TRIAMCINOLONE (MYCOLOG) OINTMENT    Apply topically 2 (two) times daily.    ONETOUCH ULTRASOFT LANCETS MISC    OneTouch UltraSoft Lancets   use as directed    OXYBUTYNIN (DITROPAN) 5 MG TAB    Take 1 tablet (5 mg total) by mouth 3 (three) times daily.    TAMSULOSIN (FLOMAX) 0.4 MG CAP    Take 0.4 mg by mouth once daily.    WARFARIN (COUMADIN) 5 MG TABLET    Take 5 mg by mouth.        Past Social History:   Social History     Socioeconomic History    Marital status:    Tobacco Use    Smoking status: Never    Smokeless tobacco: Never   Substance and Sexual Activity    Alcohol use: Not Currently    Drug use: Never     Social Determinants of Health     Stress: No Stress Concern Present (10/11/2019)    [facility] of Occupational Health - Occupational Stress Questionnaire     Feeling of Stress : Not at all       Allergies:   Review of patient's allergies indicates:   Allergen Reactions    Codeine Itching and Nausea And Vomiting        Family History:   Family History   Problem Relation Age of Onset    Heart disease Mother     Heart disease Father         Review of Systems:  Review of Systems   Constitutional: Negative.    HENT: Negative.     Eyes: Negative.    Respiratory: Negative.     Cardiovascular: Negative.    Gastrointestinal: Negative.    Endocrine: Negative.    Genitourinary: Negative.    Musculoskeletal: Negative.    Skin: Negative.    Allergic/Immunologic: Negative.    Neurological: Negative.    Hematological: Negative.    Psychiatric/Behavioral: Negative.         Physical Exam:  Physical Exam  Constitutional:       Appearance: He is normal  weight.   HENT:      Head: Normocephalic.      Nose: Nose normal.      Mouth/Throat:      Mouth: Mucous membranes are moist.      Pharynx: Oropharynx is clear.   Eyes:      Extraocular Movements: Extraocular movements intact.      Conjunctiva/sclera: Conjunctivae normal.      Pupils: Pupils are equal, round, and reactive to light.   Cardiovascular:      Rate and Rhythm: Normal rate and regular rhythm.      Pulses: Normal pulses.      Heart sounds: Normal heart sounds.   Pulmonary:      Effort: Pulmonary effort is normal.      Breath sounds: Normal breath sounds.   Abdominal:      General: Abdomen is flat. Bowel sounds are normal.      Palpations: Abdomen is soft.      Hernia: There is no hernia in the right inguinal area or left inguinal area.   Genitourinary:     Penis: Normal. No phimosis, paraphimosis, hypospadias, erythema, tenderness or discharge.       Testes: Normal.         Right: Mass, tenderness or swelling not present. Right testis is descended. Cremasteric reflex is present.          Left: Mass, tenderness or swelling not present. Left testis is descended. Cremasteric reflex is present.       Prostate: Normal.      Rectum: Normal.   Musculoskeletal:         General: Normal range of motion.      Cervical back: Normal range of motion and neck supple.   Lymphadenopathy:      Lower Body: No right inguinal adenopathy. No left inguinal adenopathy.   Skin:     General: Skin is warm and dry.      Capillary Refill: Capillary refill takes less than 2 seconds.   Neurological:      General: No focal deficit present.      Mental Status: He is alert and oriented to person, place, and time. Mental status is at baseline.   Psychiatric:         Mood and Affect: Mood normal.         Behavior: Behavior normal.         Thought Content: Thought content normal.         Judgment: Judgment normal.         Assessment/Plan:     Prostate cancer:  Patient is on primary hormonal therapy.  Patient is due for Lupron today.  We will  administer Lupron and have him follow up in 6 months with a PSA.    I, Dr. Ritchie Rosado have seen and personally evaluated the patient. I have formulated the plan reviewed all pertinent imaging and clinical data.  I agree with the nurse practitioner's assessment, and I have personally formulated the plan for this patient's care as described by the midlevel.   Problem List Items Addressed This Visit    None

## 2024-04-26 ENCOUNTER — TELEPHONE (OUTPATIENT)
Dept: UROLOGY | Facility: CLINIC | Age: 89
End: 2024-04-26
Payer: MEDICARE

## 2024-04-26 NOTE — TELEPHONE ENCOUNTER
"Spoke with patient in regards to scheduling an appointment. Patient agreed to seeing a nurse practitioner. States " My urine turns dark at times and I just want to get it checked" Advised patient that we can have him come in on Monday to drop off a urine sample and evaluate to see if he needs further treatment. Patient verbalized understanding and confirmed nurse visit date and time.       ----- Message from Clipcopia sent at 4/26/2024  3:28 PM CDT -----  Contact: self  Type:  Sooner Apoointment Request    Caller is requesting a sooner appointment.  Caller declined first available appointment listed below.  Caller will not accept being placed on the waitlist and is requesting a message be sent to doctor.  Name of Caller:Mae Hill Sr.  When is the first available appointment?June  Symptoms:prostate trouble  Would the patient rather a call back or a response via [facility]? Call back  Best Call Back Number:[phone]  Additional Information: n/a  "

## 2024-07-01 ENCOUNTER — OFFICE VISIT (OUTPATIENT)
Dept: UROLOGY | Facility: CLINIC | Age: 89
End: 2024-07-01
Payer: MEDICARE

## 2024-07-01 VITALS
HEIGHT: 69 IN | SYSTOLIC BLOOD PRESSURE: 102 MMHG | HEART RATE: 79 BPM | DIASTOLIC BLOOD PRESSURE: 74 MMHG | OXYGEN SATURATION: 93 % | BODY MASS INDEX: 27.62 KG/M2

## 2024-07-01 DIAGNOSIS — N50.812 PAIN IN LEFT TESTICLE: Primary | ICD-10-CM

## 2024-07-01 PROCEDURE — 1160F RVW MEDS BY RX/DR IN RCRD: CPT | Mod: CPTII,S$GLB,, | Performed by: NURSE PRACTITIONER

## 2024-07-01 PROCEDURE — 1126F AMNT PAIN NOTED NONE PRSNT: CPT | Mod: CPTII,S$GLB,, | Performed by: NURSE PRACTITIONER

## 2024-07-01 PROCEDURE — 99213 OFFICE O/P EST LOW 20 MIN: CPT | Mod: S$GLB,,, | Performed by: NURSE PRACTITIONER

## 2024-07-01 PROCEDURE — 1159F MED LIST DOCD IN RCRD: CPT | Mod: CPTII,S$GLB,, | Performed by: NURSE PRACTITIONER

## 2024-07-01 PROCEDURE — 1101F PT FALLS ASSESS-DOCD LE1/YR: CPT | Mod: CPTII,S$GLB,, | Performed by: NURSE PRACTITIONER

## 2024-07-01 PROCEDURE — 3288F FALL RISK ASSESSMENT DOCD: CPT | Mod: CPTII,S$GLB,, | Performed by: NURSE PRACTITIONER

## 2024-07-01 NOTE — PROGRESS NOTES
Subjective:       Patient ID: Mae Hill Sr. is a 89 y.o. male.    Chief Complaint: No chief complaint on file.      HPI: 89-year-old male, established patient, with a complaint of left testicular pain.    Patient does have history of prostate cancer.  He was on Lupron injections.    Patient states he has been having pain to his left testicle for about 6 months.  However, records show he was seen for this over year ago.    States his pain is currently 0/10 with episodes of 3/10.  Describes the pain as a pressure.  States pain is worse when he sits down.    Denies any pain or burning urination.  Denies any odor to the urine.  Denies any fever or body aches.  Denies any blood in urine.    No other urinary complaints at this time.         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    ATORVASTATIN (LIPITOR) 40 MG TABLET    TAKE 1 TABLET BY MOUTH ONCE DAILY AT BEDTIME    BICALUTAMIDE (CASODEX) 50 MG TAB    Take 1 tablet (50 mg total) by mouth once daily.    BLOOD SUGAR DIAGNOSTIC STRP    OneTouch Ultra Blue Test Strips   use TID as directed    CARVEDILOL (COREG) 6.25 MG TABLET    Take 6.25 mg by mouth 2 (two) times daily.    CLOTRIMAZOLE (LOTRIMIN) 1 % CREAM    APPLY  CREAM TOPICALLY TO  AFFECTED AREA TWICE DAILY. MIX 1:1 WITH HYDROCORTISONE CREAM AND APPLY TO AFFECTED AREA FOR 14 DAYS    DONEPEZIL (ARICEPT) 10 MG TABLET    Take 10 mg by mouth every evening.    ELIQUIS 5 MG TAB    Take 5 mg by mouth 2 (two) times daily.    FINASTERIDE (PROSCAR) 5 MG TABLET    Take 5 mg by mouth once daily.    FUROSEMIDE (LASIX) 40 MG TABLET    Take 40 mg by mouth once daily.    LEVOTHYROXINE (SYNTHROID) 50 MCG TABLET    Take 50 mcg by mouth every morning. take on an empty stomach    MEMANTINE (NAMENDA) 10 MG TAB    Take 10 mg by mouth 2 (two) times daily.    MUPIROCIN (BACTROBAN) 2 % OINTMENT    APPLY OINTMENT TOPICALLY TO AFFECTED AREA TWICE DAILY    NYSTATIN-TRIAMCINOLONE (MYCOLOG) OINTMENT    Apply topically 2 (two) times daily.    ONETOUCH ULTRASOFT LANCETS MISC    OneTouch UltraSoft Lancets   use as directed    OXYBUTYNIN (DITROPAN) 5 MG TAB    Take 1 tablet (5 mg total) by mouth 3 (three) times daily.    TAMSULOSIN (FLOMAX) 0.4 MG CAP    Take 0.4 mg by mouth once daily.    WARFARIN (COUMADIN) 5 MG TABLET    Take 5 mg by mouth.        Past Social History:   Social History     Socioeconomic History    Marital status:    Tobacco Use    Smoking status: Never    Smokeless tobacco: Never   Substance and Sexual Activity    Alcohol use: Not Currently    Drug use: Never     Social Determinants of Health     Stress: No Stress Concern Present (10/11/2019)    [employer] of Occupational Health - Occupational Stress Questionnaire     Feeling of Stress : Not at all       Allergies:   Review of patient's allergies indicates:   Allergen Reactions    Codeine Itching and Nausea And Vomiting        Family History:   Family History   Problem Relation Name Age of Onset    Heart disease Mother      Heart disease Father          Review of Systems:  Review of Systems   Constitutional:  Negative for activity change and appetite change.   HENT:  Negative for congestion and dental problem.    Respiratory:  Negative for  chest tightness and shortness of breath.    Cardiovascular:  Negative for chest pain.   Gastrointestinal:  Negative for abdominal distention and abdominal pain.   Genitourinary:  Positive for testicular pain. Negative for decreased urine volume, difficulty urinating, dysuria, enuresis, flank pain, frequency, genital sores, hematuria, penile discharge, penile pain, penile swelling, scrotal swelling and urgency.   Musculoskeletal:  Negative for back pain and neck pain.   Neurological:  Negative for dizziness.   Hematological:  Negative for adenopathy.   Psychiatric/Behavioral:  Negative for agitation, behavioral problems and confusion.        Physical Exam:  Physical Exam  Vitals and nursing note reviewed.   Constitutional:       Appearance: He is well-developed.   HENT:      Head: Normocephalic.   Cardiovascular:      Rate and Rhythm: Normal rate and regular rhythm.      Heart sounds: Normal heart sounds.   Pulmonary:      Effort: Pulmonary effort is normal.      Breath sounds: Normal breath sounds.   Abdominal:      General: Bowel sounds are normal.      Palpations: Abdomen is soft.   Genitourinary:     Penis: Normal.       Testes: Normal.   Skin:     General: Skin is warm and dry.   Neurological:      Mental Status: He is alert and oriented to person, place, and time.         Assessment/Plan:   Left testicular pain: Schedule patient for ultrasound of the scrotum and testicles.    Patient encouraged to wear supportive underwear.  We will notify patient results of imaging.    Patient has appointment scheduled next month with Dr. Rosado, follow-up as scheduled.  Problem List Items Addressed This Visit    None  Visit Diagnoses       Pain in left testicle    -  Primary    Relevant Orders    US Scrotum And Testicles

## 2024-07-02 ENCOUNTER — TELEPHONE (OUTPATIENT)
Dept: UROLOGY | Facility: CLINIC | Age: 89
End: 2024-07-02
Payer: MEDICARE

## 2024-07-02 NOTE — TELEPHONE ENCOUNTER
Spoke with pt and informed of rsults.    ----- Message from Thor Tabor NP sent at 7/2/2024  4:40 PM CDT -----  Ultrasound shows bilateral hydroceles and a right varicocele.    Patient encouraged to wear supportive underwear.    He has an appointment with Dr. Rosado next month.  Follow-up as scheduled.

## 2024-07-08 ENCOUNTER — TELEPHONE (OUTPATIENT)
Dept: UROLOGY | Facility: CLINIC | Age: 89
End: 2024-07-08
Payer: MEDICARE

## 2024-07-08 NOTE — TELEPHONE ENCOUNTER
Attempted to return call to pt. Pt was not prescribed any medication at [facility]. Left ----- Message from Mey Main sent at 7/8/2024  9:14 AM CDT -----  Contact: pt  Pt call wanting to know if he was prescribed anything on his visit.  Pt can be reached at [phone]     Thanks,

## 2024-07-24 ENCOUNTER — TELEPHONE (OUTPATIENT)
Dept: UROLOGY | Facility: CLINIC | Age: 89
End: 2024-07-24
Payer: MEDICARE

## 2024-07-24 NOTE — TELEPHONE ENCOUNTER
Spoke with pt and informed him that he will need to call his PCP Dr. Purvis to have his carvedilol filled.    ----- Message from rC Stanton sent at 7/24/2024  8:28 AM CDT -----  Contact: Marivel(Wife)  Type:  RX Refill Request    Who Called: Marivel  Refill or New Rx:Refill  RX Name and Strength:carvediloL (COREG) 6.25 MG tablet  How is the patient currently taking it? (ex. 1XDay):Twice Daily   Is this a 30 day or 90 day RX:30  Preferred Pharmacy with phone number:  [facility] [address] [address]  Phone: [phone] Fax: [phone]  Local or Mail Order:Local   Ordering Provider:ROGERIO Tabor   Would the patient rather a call back or a response via [facility]? Call Back   Best Call Back Number:[phone]  Additional Information:

## 2024-08-16 ENCOUNTER — TELEPHONE (OUTPATIENT)
Dept: UROLOGY | Facility: CLINIC | Age: 89
End: 2024-08-16
Payer: MEDICARE

## 2024-08-16 NOTE — TELEPHONE ENCOUNTER
Pt informed of next ravin with naina.    ----- Message from Re Lai sent at 8/16/2024  9:02 AM CDT -----  Regarding: Same Day Appointment  Contact: patient  Type:  Same Day Appointment Request    Caller is requesting a same day appointment.  Caller declined first available appointment listed below.    Name of Caller:Mae   When is the first available appointment? N/a  Symptoms: shot for prostate   Best Call Back Number: [phone] (home)     Additional Information:     Thanks,  SJ

## 2024-08-22 ENCOUNTER — OFFICE VISIT (OUTPATIENT)
Dept: UROLOGY | Facility: CLINIC | Age: 89
End: 2024-08-22
Payer: MEDICARE

## 2024-08-22 VITALS
HEART RATE: 97 BPM | BODY MASS INDEX: 27.25 KG/M2 | WEIGHT: 184 LBS | DIASTOLIC BLOOD PRESSURE: 63 MMHG | SYSTOLIC BLOOD PRESSURE: 142 MMHG | HEIGHT: 69 IN

## 2024-08-22 DIAGNOSIS — C61 PROSTATE CANCER: Primary | ICD-10-CM

## 2024-08-22 LAB — PSA, DIAGNOSTIC: 0.01 NG/ML (ref 0.1–4)

## 2024-08-22 RX ORDER — ACETAMINOPHEN 500 MG
TABLET ORAL
COMMUNITY

## 2024-08-22 RX ORDER — LINAGLIPTIN 5 MG/1
1 TABLET, FILM COATED ORAL DAILY
COMMUNITY

## 2024-08-22 RX ORDER — MONTELUKAST SODIUM 10 MG/1
TABLET ORAL
COMMUNITY

## 2024-08-22 RX ORDER — ACETAMINOPHEN 325 MG/1
TABLET ORAL
COMMUNITY

## 2024-08-22 RX ORDER — AMLODIPINE BESYLATE 2.5 MG/1
1 TABLET ORAL DAILY
COMMUNITY

## 2024-08-22 NOTE — PROGRESS NOTES
Subjective:       Patient ID: Mae Hill Sr. is a 89 y.o. male.    Chief Complaint: No chief complaint on file.      HPI:  89-year-old male with prostate cancer diagnosed several years ago initially he was treated with Casodex only he had breast tenderness and was switched to Lupron.  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    AMLODIPINE (NORVASC) 2.5 MG TABLET    Take 1 tablet by mouth once daily.    ASPIRIN (ECOTRIN) 81 MG EC TABLET    Take 81 mg by mouth once daily.    ATORVASTATIN (LIPITOR) 40 MG TABLET    TAKE 1 TABLET BY MOUTH ONCE DAILY AT BEDTIME    BICALUTAMIDE (CASODEX) 50 MG TAB    Take 1 tablet (50 mg total) by mouth once daily.    BLOOD SUGAR DIAGNOSTIC STRP    OneTouch Ultra Blue Test Strips   use TID as directed    CARVEDILOL (COREG) 6.25 MG TABLET    Take 6.25 mg by mouth 2 (two) times daily.    CHOLECALCIFEROL, VITAMIN D3, (VITAMIN D3) 50 MCG (2,000 UNIT) CAP CAPSULE    Take 1 capsule every day by oral route.    CLOTRIMAZOLE (LOTRIMIN) 1 % CREAM    APPLY  CREAM TOPICALLY TO AFFECTED AREA TWICE DAILY. MIX 1:1 WITH HYDROCORTISONE CREAM AND APPLY  TO AFFECTED AREA FOR 14 DAYS    CYANOCOBALAMIN, VITAMIN B-12, 1,000 MCG/ML DROP    5000mcg Q day    DONEPEZIL (ARICEPT) 10 MG TABLET    Take 10 mg by mouth every evening.    ELIQUIS 5 MG TAB    Take 5 mg by mouth 2 (two) times daily.    FINASTERIDE (PROSCAR) 5 MG TABLET    Take 5 mg by mouth once daily.    FUROSEMIDE (LASIX) 40 MG TABLET    Take 40 mg by mouth once daily.    LEVOTHYROXINE (SYNTHROID) 50 MCG TABLET    Take 50 mcg by mouth every morning. take on an empty stomach    LINAGLIPTIN (TRADJENTA) 5 MG TAB TABLET    Take 1 tablet by mouth once daily.    MEMANTINE (NAMENDA) 10 MG TAB    Take 10 mg by mouth 2 (two) times daily.    MONTELUKAST (SINGULAIR) 10 MG TABLET    Take 1 tablet every day by oral route for 14 days.    MUPIROCIN (BACTROBAN) 2 % OINTMENT    APPLY OINTMENT TOPICALLY TO AFFECTED AREA TWICE DAILY    NYSTATIN-TRIAMCINOLONE (MYCOLOG) OINTMENT    Apply topically 2 (two) times daily.    ONETOUCH ULTRASOFT LANCETS MISC    OneTouch UltraSoft Lancets   use as directed    OXYBUTYNIN (DITROPAN) 5 MG TAB    Take 1 tablet (5 mg total) by mouth 3 (three) times daily.    TAMSULOSIN (FLOMAX) 0.4 MG CAP    Take 0.4 mg by mouth once daily.    WARFARIN (COUMADIN) 5 MG TABLET    Take 5 mg by mouth.        Past Social History:   Social History     Socioeconomic History    Marital status:    Tobacco Use    Smoking status: Never    Smokeless tobacco: Never   Substance and Sexual Activity    Alcohol use: Not Currently    Drug use: Never     Social Determinants of Health     Stress: No Stress Concern Present (10/11/2019)    [employer] [place] [employer] - Occupational Stress Questionnaire     Feeling of Stress : Not at all       Allergies:   Review of patient's allergies indicates:   Allergen Reactions    Codeine Itching and Nausea And Vomiting        Family History:   Family History   Problem Relation Name Age of Onset    Heart disease Mother      Heart disease Father          Review of  Systems:  Review of Systems   Constitutional:  Negative for activity change and appetite change.   HENT:  Negative for congestion and dental problem.    Eyes:  Negative for visual disturbance.   Respiratory:  Negative for chest tightness and shortness of breath.    Cardiovascular:  Negative for chest pain.   Gastrointestinal:  Negative for abdominal distention and abdominal pain.   Genitourinary:  Negative for decreased urine volume, difficulty urinating, dysuria, enuresis, flank pain, frequency, genital sores, hematuria, penile discharge, penile pain, penile swelling, scrotal swelling, testicular pain and urgency.   Musculoskeletal:  Negative for back pain and neck pain.   Skin:  Negative for color change.   Neurological:  Negative for dizziness.   Hematological:  Negative for adenopathy.   Psychiatric/Behavioral:  Negative for agitation, behavioral problems and confusion.        Physical Exam:  Physical Exam  Constitutional:       General: He is not in acute distress.     Appearance: He is well-developed.   HENT:      Head: Normocephalic and atraumatic.      Nose: Nose normal.   Eyes:      General: No scleral icterus.     Conjunctiva/sclera: Conjunctivae normal.      Pupils: Pupils are equal, round, and reactive to light.   Neck:      Thyroid: No thyromegaly.      Trachea: No tracheal deviation.   Cardiovascular:      Rate and Rhythm: Normal rate and regular rhythm.      Heart sounds: Normal heart sounds.   Pulmonary:      Effort: Pulmonary effort is normal. No respiratory distress.      Breath sounds: Normal breath sounds. No wheezing or rales.   Abdominal:      General: Bowel sounds are normal. There is no distension.      Palpations: Abdomen is soft.      Tenderness: There is no abdominal tenderness. There is no guarding or rebound.   Genitourinary:     Penis: Normal. No tenderness.       Prostate: Normal.   Musculoskeletal:         General: No deformity. Normal range of motion.      Cervical back: Neck supple.    Lymphadenopathy:      Cervical: No cervical adenopathy.   Skin:     General: Skin is warm and dry.      Findings: No erythema or rash.   Neurological:      Mental Status: He is alert and oriented to person, place, and time.      Cranial Nerves: No cranial nerve deficit.   Psychiatric:         Behavior: Behavior normal.         Assessment/Plan:       Problem List Items Addressed This Visit    None  Visit Diagnoses       Prostate cancer    -  Primary    Relevant Medications    leuprolide acetate (6 month) injection 45 mg (Start on 8/22/2024  2:15 PM)    Other Relevant Orders    Prostate Specific Antigen, Diagnostic               89-year-old male with prostate cancer managed on primary hormonal therapy:   We will draw PSA today we did discuss intermittent hormonal therapy we will give a Lupron today as well

## 2024-08-22 NOTE — PROGRESS NOTES
Subjective:       Patient ID: Mae Hill Sr. is a 89 y.o. male.    Chief Complaint: No chief complaint on file.      HPI: 89-year-old male patient with a history of prostate cancer that was diagnosed prior to 2019.  He was initially managed with Casodex but had breast tenderness and a rise in his PSA.  He was switched to Lupron primary hormonal therapy.  Doing well with no complaints at this time.  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    AMLODIPINE (NORVASC) 2.5 MG TABLET    Take 1 tablet by mouth once daily.    ASPIRIN (ECOTRIN) 81 MG EC TABLET    Take 81 mg by mouth once daily.    ATORVASTATIN (LIPITOR) 40 MG TABLET    TAKE 1 TABLET BY MOUTH ONCE DAILY AT BEDTIME    BICALUTAMIDE (CASODEX) 50 MG TAB    Take 1 tablet (50 mg total) by mouth once daily.    BLOOD SUGAR DIAGNOSTIC STRP    OneTouch Ultra Blue Test Strips   use TID as directed    CARVEDILOL (COREG) 6.25 MG TABLET    Take 6.25 mg by mouth 2 (two) times daily.    CHOLECALCIFEROL, VITAMIN D3, (VITAMIN D3) 50 MCG (2,000 UNIT) CAP CAPSULE    Take 1 capsule every day by oral route.    CLOTRIMAZOLE (LOTRIMIN) 1 %  CREAM    APPLY  CREAM TOPICALLY TO AFFECTED AREA TWICE DAILY. MIX 1:1 WITH HYDROCORTISONE CREAM AND APPLY TO AFFECTED AREA FOR 14 DAYS    CYANOCOBALAMIN, VITAMIN B-12, 1,000 MCG/ML DROP    5000mcg Q day    DONEPEZIL (ARICEPT) 10 MG TABLET    Take 10 mg by mouth every evening.    ELIQUIS 5 MG TAB    Take 5 mg by mouth 2 (two) times daily.    FINASTERIDE (PROSCAR) 5 MG TABLET    Take 5 mg by mouth once daily.    FUROSEMIDE (LASIX) 40 MG TABLET    Take 40 mg by mouth once daily.    LEVOTHYROXINE (SYNTHROID) 50 MCG TABLET    Take 50 mcg by mouth every morning. take on an empty stomach    LINAGLIPTIN (TRADJENTA) 5 MG TAB TABLET    Take 1 tablet by mouth once daily.    MEMANTINE (NAMENDA) 10 MG TAB    Take 10 mg by mouth 2 (two) times daily.    MONTELUKAST (SINGULAIR) 10 MG TABLET    Take 1 tablet every day by oral route for 14 days.    MUPIROCIN (BACTROBAN) 2 % OINTMENT    APPLY OINTMENT TOPICALLY TO AFFECTED AREA TWICE DAILY    NYSTATIN-TRIAMCINOLONE (MYCOLOG) OINTMENT    Apply topically 2 (two) times daily.    ONETOUCH ULTRASOFT LANCETS MISC    OneTouch UltraSoft Lancets   use as directed    OXYBUTYNIN (DITROPAN) 5 MG TAB    Take 1 tablet (5 mg total) by mouth 3 (three) times daily.    TAMSULOSIN (FLOMAX) 0.4 MG CAP    Take 0.4 mg by mouth once daily.    WARFARIN (COUMADIN) 5 MG TABLET    Take 5 mg by mouth.        Past Social History:   Social History     Socioeconomic History    Marital status:    Tobacco Use    Smoking status: Never    Smokeless tobacco: Never   Substance and Sexual Activity    Alcohol use: Not Currently    Drug use: Never     Social Determinants of Health     Stress: No Stress Concern Present (10/11/2019)    [employer] - Occupational Stress Questionnaire     Feeling of Stress : Not at all       Allergies:   Review of patient's allergies indicates:   Allergen Reactions    Codeine Itching and Nausea And Vomiting        Family History:   Family History   Problem  Relation Name Age of Onset    Heart disease Mother      Heart disease Father          Review of Systems:  Review of Systems   Constitutional: Negative.    HENT: Negative.     Eyes: Negative.    Respiratory: Negative.     Cardiovascular: Negative.    Gastrointestinal: Negative.    Endocrine: Negative.    Genitourinary: Negative.    Musculoskeletal: Negative.    Skin: Negative.    Allergic/Immunologic: Negative.    Neurological: Negative.    Hematological: Negative.    Psychiatric/Behavioral: Negative.         Physical Exam:  Physical Exam  Constitutional:       Appearance: He is normal weight.   HENT:      Head: Normocephalic.      Nose: Nose normal.      Mouth/Throat:      Mouth: Mucous membranes are moist.      Pharynx: Oropharynx is clear.   Eyes:      Extraocular Movements: Extraocular movements intact.      Conjunctiva/sclera: Conjunctivae normal.      Pupils: Pupils are equal, round, and reactive to light.   Cardiovascular:      Rate and Rhythm: Normal rate and regular rhythm.      Pulses: Normal pulses.      Heart sounds: Normal heart sounds.   Pulmonary:      Effort: Pulmonary effort is normal.      Breath sounds: Normal breath sounds.   Abdominal:      General: Abdomen is flat. Bowel sounds are normal.      Palpations: Abdomen is soft.      Hernia: There is no hernia in the right inguinal area or left inguinal area.   Genitourinary:     Penis: Normal. No phimosis, paraphimosis, hypospadias, erythema, tenderness or discharge.       Testes: Normal.         Right: Mass, tenderness or swelling not present. Right testis is descended. Cremasteric reflex is present.          Left: Mass, tenderness or swelling not present. Left testis is descended. Cremasteric reflex is present.       Prostate: Normal.      Rectum: Normal.   Musculoskeletal:         General: Normal range of motion.      Cervical back: Normal range of motion and neck supple.   Lymphadenopathy:      Lower Body: No right inguinal adenopathy. No left  inguinal adenopathy.   Skin:     General: Skin is warm and dry.      Capillary Refill: Capillary refill takes less than 2 seconds.   Neurological:      General: No focal deficit present.      Mental Status: He is alert and oriented to person, place, and time. Mental status is at baseline.   Psychiatric:         Mood and Affect: Mood normal.         Behavior: Behavior normal.         Thought Content: Thought content normal.         Judgment: Judgment normal.         Assessment/Plan:     Prostate cancer:  Patient is on primary hormonal therapy.  Patient is due for Lupron today.  We will administer Lupron and have him follow up in 6 months with a PSA.  Discussed possible intermittent hormonal therapy depending on next PSA result    I, Dr. Ritchie Rosado have seen and personally evaluated the patient. I have formulated the plan reviewed all pertinent imaging and clinical data.  I agree with the nurse practitioner's assessment, and I have personally formulated the plan for this patient's care as described by the midlevel.   Problem List Items Addressed This Visit    None  Visit Diagnoses       Prostate cancer    -  Primary    Relevant Medications    leuprolide acetate (6 month) injection 45 mg (Start on 8/22/2024  2:15 PM)    Other Relevant Orders    Prostate Specific Antigen, Diagnostic

## 2024-11-29 ENCOUNTER — TELEPHONE (OUTPATIENT)
Dept: UROLOGY | Facility: CLINIC | Age: 89
End: 2024-11-29
Payer: MEDICARE

## 2024-11-29 NOTE — TELEPHONE ENCOUNTER
Contacted pt, c/o of frequency and testicle pain. Suggested pt being seen by NP, he agrees to seeing NP/[facility]. No soon availability, suggested NP/BL pt agrees. Scheduled. SHER    ----- Message from Re sent at 11/29/2024 12:18 PM CST -----  Regarding: Appointment  Contact: patient  Type:  Sooner Apoointment Request    Caller is requesting a sooner appointment.  Caller declined first available appointment listed below.  Caller will not accept being placed on the waitlist and is requesting a message be sent to doctor.  Name of Caller:Mae   When is the first available appointment?02/21/2025 and 02/25/2025  Symptoms:psa  Would the patient rather a call back or a response via [facility]?  Call back   Best Call Back Number: [phone] (home)     Additional Information:     DOMINGUEZ Lopes

## 2024-12-09 ENCOUNTER — TELEPHONE (OUTPATIENT)
Dept: UROLOGY | Facility: CLINIC | Age: 89
End: 2024-12-09
Payer: MEDICARE

## 2024-12-09 NOTE — TELEPHONE ENCOUNTER
Lengthy call. Pt states at last apt he was suppose to get a medication but never did. Read last ov note nothing about a med being sent out. Pt states it was [facility], NP. Nothing in that note. Unable to determine what prostate med he was thinking. Confirmed next appointment with [facility] for lab then with Dr Rosado.    Pt now states he still has left testicle pain and would like medication for that since his next apt not until Feb 2025.

## 2024-12-09 NOTE — TELEPHONE ENCOUNTER
Attempted to call pt back.  Phone rang and rang with no answer and no VM option.----- Message from [employer] sent at 12/9/2024 12:49 PM CST -----  Contact: Mae  Type:  Needs Medical Advice    Who Called: Patient   Symptoms (please be specific):   How long has patient had these symptoms:    Pharmacy name and phone #:    Would the patient rather a call back or a response via [facility]? Call back   Best Call Back Number: [phone]  Additional Information: He says that he was seen by  and that there was a medication prescribed and that he will like to know what it is.

## 2024-12-09 NOTE — TELEPHONE ENCOUNTER
----- Message from Suzi sent at 12/9/2024  3:54 PM CST -----  Contact: self  Type:  RX Refill Request    Who Called: Mae Hill Sr.  Refill or New Rx:new  RX Name and Strength:prostate medication  How is the patient currently taking it? (ex. 1XDay):daily  Is this a 30 day or 90 day RX:30  Preferred Pharmacy with phone number:  [facility] [address] [address]  Phone: [phone] Fax: [phone]    Local or Mail Order:local  Ordering Provider:naina  Would the patient rather a call back or a response via [facility]? katalina  Best Call Back Number:[phone]  Additional Information: n/a

## 2024-12-10 ENCOUNTER — TELEPHONE (OUTPATIENT)
Dept: UROLOGY | Facility: CLINIC | Age: 89
End: 2024-12-10
Payer: MEDICARE

## 2024-12-10 NOTE — TELEPHONE ENCOUNTER
Spoke with pt >15 minutes 12/9/24. Determined it was pain medication for testicle not prostate. Message was sent to provider 12/9/24.

## 2024-12-10 NOTE — TELEPHONE ENCOUNTER
----- Message from Mey sent at 12/10/2024  2:13 PM CST -----  Regarding: new script status  Contact: pt  Pt calling about status new script for prostate medication that was supposed to be called out a few days ago.  Pt can be reached at [phone] for cell [phone]      [facility] [address] [place] [id number]  Phone: [phone] Fax: [phone]    Thanks,

## 2024-12-11 ENCOUNTER — CLINICAL SUPPORT (OUTPATIENT)
Dept: UROLOGY | Facility: CLINIC | Age: 89
End: 2024-12-11
Payer: MEDICARE

## 2024-12-11 DIAGNOSIS — N50.812 PAIN IN LEFT TESTICLE: Primary | ICD-10-CM

## 2024-12-11 LAB
BILIRUBIN, UA POC OHS: NEGATIVE
BLOOD, UA POC OHS: ABNORMAL
CLARITY, UA POC OHS: CLEAR
COLOR, UA POC OHS: YELLOW
GLUCOSE, UA POC OHS: NEGATIVE
KETONES, UA POC OHS: NEGATIVE
LEUKOCYTES, UA POC OHS: ABNORMAL
NITRITE, UA POC OHS: NEGATIVE
PH, UA POC OHS: 6.5
PROTEIN, UA POC OHS: NEGATIVE
SPECIFIC GRAVITY, UA POC OHS: 1.01
UROBILINOGEN, UA POC OHS: 2

## 2024-12-12 ENCOUNTER — OFFICE VISIT (OUTPATIENT)
Dept: UROLOGY | Facility: CLINIC | Age: 89
End: 2024-12-12
Payer: MEDICARE

## 2024-12-12 VITALS — HEIGHT: 69 IN | WEIGHT: 184 LBS | BODY MASS INDEX: 27.25 KG/M2

## 2024-12-12 DIAGNOSIS — N43.3 HYDROCELE, UNSPECIFIED HYDROCELE TYPE: ICD-10-CM

## 2024-12-12 DIAGNOSIS — N50.812 PAIN IN LEFT TESTICLE: Primary | ICD-10-CM

## 2024-12-12 DIAGNOSIS — I86.1 VARICOCELE: ICD-10-CM

## 2024-12-12 PROCEDURE — 1159F MED LIST DOCD IN RCRD: CPT | Mod: CPTII,,, | Performed by: NURSE PRACTITIONER

## 2024-12-12 PROCEDURE — 99212 OFFICE O/P EST SF 10 MIN: CPT | Mod: S$PBB,,, | Performed by: NURSE PRACTITIONER

## 2024-12-12 PROCEDURE — 3288F FALL RISK ASSESSMENT DOCD: CPT | Mod: CPTII,,, | Performed by: NURSE PRACTITIONER

## 2024-12-12 PROCEDURE — 1101F PT FALLS ASSESS-DOCD LE1/YR: CPT | Mod: CPTII,,, | Performed by: NURSE PRACTITIONER

## 2024-12-12 PROCEDURE — 1160F RVW MEDS BY RX/DR IN RCRD: CPT | Mod: CPTII,,, | Performed by: NURSE PRACTITIONER

## 2024-12-12 RX ORDER — CARVEDILOL 12.5 MG/1
12.5 TABLET ORAL 2 TIMES DAILY
COMMUNITY

## 2024-12-12 NOTE — PROGRESS NOTES
Subjective:       Patient ID: Mae Hill Sr. is a 89 y.o. male.    Chief Complaint: No chief complaint on file.      HPI:  89-year-old male established patient, last seen in August by Alonzo.    Patient has history of prostate cancer.  He is Lupron injections.  Last injection was in August.    Patient presents today complaining of left testicular pain.  Patient has a history of left testicular pain.  He has had imaging in the past most recently in July 2024, which showed bilateral  hydrocele and right varicocele.     Patient states he has off and on episodes of pain to his left testicle.  States his primarily happens when he sits down.  States the pain can be severe at times but is only lasting for a short.    Denies any pain or burning urination.  Denies any odor to the urine.  Denies any fever body aches.  Denies any blood in urine.  Denies any unexpected weight loss.    No other urinary complaints at this time.         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    AMLODIPINE (NORVASC) 2.5 MG TABLET    Take 1 tablet by mouth once daily.    ASPIRIN (ECOTRIN) 81 MG EC TABLET    Take 81 mg by mouth once daily.    ATORVASTATIN (LIPITOR) 40 MG TABLET    TAKE  1 TABLET BY MOUTH ONCE DAILY AT BEDTIME    BICALUTAMIDE (CASODEX) 50 MG TAB    Take 1 tablet (50 mg total) by mouth once daily.    BLOOD SUGAR DIAGNOSTIC STRP    OneTouch Ultra Blue Test Strips   use TID as directed    CARVEDILOL (COREG) 12.5 MG TABLET    Take 12.5 mg by mouth 2 (two) times daily. as directed.    CARVEDILOL (COREG) 6.25 MG TABLET    Take 6.25 mg by mouth 2 (two) times daily.    CHOLECALCIFEROL, VITAMIN D3, (VITAMIN D3) 50 MCG (2,000 UNIT) CAP CAPSULE    Take 1 capsule every day by oral route.    CLOTRIMAZOLE (LOTRIMIN) 1 % CREAM    APPLY  CREAM TOPICALLY TO AFFECTED AREA TWICE DAILY. MIX 1:1 WITH HYDROCORTISONE CREAM AND APPLY TO AFFECTED AREA FOR 14 DAYS    CYANOCOBALAMIN, VITAMIN B-12, 1,000 MCG/ML DROP    5000mcg Q day    DONEPEZIL (ARICEPT) 10 MG TABLET    Take 10 mg by mouth every evening.    ELIQUIS 5 MG TAB    Take 5 mg by mouth 2 (two) times daily.    FINASTERIDE (PROSCAR) 5 MG TABLET    Take 5 mg by mouth once daily.    FUROSEMIDE (LASIX) 40 MG TABLET    Take 40 mg by mouth once daily.    LEVOTHYROXINE (SYNTHROID) 50 MCG TABLET    Take 50 mcg by mouth every morning. take on an empty stomach    LINAGLIPTIN (TRADJENTA) 5 MG TAB TABLET    Take 1 tablet by mouth once daily.    MEMANTINE (NAMENDA) 10 MG TAB    Take 10 mg by mouth 2 (two) times daily.    MONTELUKAST (SINGULAIR) 10 MG TABLET    Take 1 tablet every day by oral route for 14 days.    MUPIROCIN (BACTROBAN) 2 % OINTMENT    APPLY OINTMENT TOPICALLY TO AFFECTED AREA TWICE DAILY    NYSTATIN-TRIAMCINOLONE (MYCOLOG) OINTMENT    Apply topically 2 (two) times daily.    ONETOUCH ULTRASOFT LANCETS MISC    OneTouch UltraSoft Lancets   use as directed    OXYBUTYNIN (DITROPAN) 5 MG TAB    Take 1 tablet (5 mg total) by mouth 3 (three) times daily.    TAMSULOSIN (FLOMAX) 0.4 MG CAP    Take 0.4 mg by mouth once daily.    WARFARIN (COUMADIN) 5 MG TABLET    Take 5 mg by mouth.        Past Social History:   Social History     Socioeconomic History     Marital status:    Tobacco Use    Smoking status: Never    Smokeless tobacco: Never   Substance and Sexual Activity    Alcohol use: Not Currently    Drug use: Never     Social Drivers of Health     Stress: No Stress Concern Present (10/11/2019)    [employer] - Occupational Stress Questionnaire     Feeling of Stress : Not at all       Allergies:   Review of patient's allergies indicates:   Allergen Reactions    Codeine Itching and Nausea And Vomiting        Family History:   Family History   Problem Relation Name Age of Onset    Heart disease Mother      Heart disease Father          Review of Systems:  Review of Systems   Constitutional:  Negative for activity change and appetite change.   HENT:  Negative for congestion and dental problem.    Respiratory:  Negative for chest tightness and shortness of breath.    Cardiovascular:  Negative for chest pain.   Gastrointestinal:  Negative for abdominal distention and abdominal pain.   Genitourinary:  Positive for testicular pain. Negative for decreased urine volume, difficulty urinating, dysuria, enuresis, flank pain, frequency, genital sores, hematuria, penile discharge, penile pain, penile swelling, scrotal swelling and urgency.   Musculoskeletal:  Negative for back pain and neck pain.   Neurological:  Negative for dizziness.   Hematological:  Negative for adenopathy.   Psychiatric/Behavioral:  Negative for agitation, behavioral problems and confusion.        Physical Exam:  Physical Exam  Vitals and nursing note reviewed.   Constitutional:       Appearance: He is well-developed.   HENT:      Head: Normocephalic.   Cardiovascular:      Rate and Rhythm: Normal rate and regular rhythm.      Heart sounds: Normal heart sounds.   Pulmonary:      Effort: Pulmonary effort is normal.      Breath sounds: Normal breath sounds.   Abdominal:      General: Bowel sounds are normal.      Palpations: Abdomen is soft.   Skin:     General: Skin is  warm and dry.   Neurological:      Mental Status: He is alert and oriented to person, place, and time.         Assessment/Plan:     Left testicle pain/hydrocele/varicocele:  We had discussed wearing supportive underwear in the past.    Patient continues to wear boxers.  We again discuss wearing supportive underwear.    Patient is a codeine allergy so he is limited with pain medication.  Patient advised to use over-the-counter ibuprofen as needed.    Patient follow-up in February as scheduled, sooner  Problem List Items Addressed This Visit    None  Visit Diagnoses       Pain in left testicle    -  Primary

## 2024-12-27 NOTE — PROGRESS NOTES
Pt showed to clinic with c/o testicle. Urinalysis ordered, negative for infection. Pt scheduled for f/u with provider. SHER

## 2025-02-18 DIAGNOSIS — C61 PROSTATE CANCER: Primary | ICD-10-CM

## 2025-02-21 LAB — PSA, DIAGNOSTIC: 0.01 NG/ML (ref 0.1–4)

## 2025-02-24 ENCOUNTER — RESULTS FOLLOW-UP (OUTPATIENT)
Dept: UROLOGY | Facility: CLINIC | Age: OVER 89
End: 2025-02-24

## 2025-02-25 ENCOUNTER — OFFICE VISIT (OUTPATIENT)
Dept: UROLOGY | Facility: CLINIC | Age: OVER 89
End: 2025-02-25
Payer: MEDICARE

## 2025-02-25 ENCOUNTER — TELEPHONE (OUTPATIENT)
Dept: UROLOGY | Facility: CLINIC | Age: OVER 89
End: 2025-02-25

## 2025-02-25 VITALS
BODY MASS INDEX: 26.07 KG/M2 | WEIGHT: 176 LBS | SYSTOLIC BLOOD PRESSURE: 100 MMHG | HEIGHT: 69 IN | HEART RATE: 89 BPM | DIASTOLIC BLOOD PRESSURE: 65 MMHG

## 2025-02-25 DIAGNOSIS — N50.812 PAIN IN LEFT TESTICLE: ICD-10-CM

## 2025-02-25 DIAGNOSIS — C61 PROSTATE CANCER: Primary | ICD-10-CM

## 2025-02-25 PROCEDURE — 1159F MED LIST DOCD IN RCRD: CPT | Mod: CPTII,,, | Performed by: UROLOGY

## 2025-02-25 PROCEDURE — 1101F PT FALLS ASSESS-DOCD LE1/YR: CPT | Mod: CPTII,,, | Performed by: UROLOGY

## 2025-02-25 PROCEDURE — 3288F FALL RISK ASSESSMENT DOCD: CPT | Mod: CPTII,,, | Performed by: UROLOGY

## 2025-02-25 PROCEDURE — 99214 OFFICE O/P EST MOD 30 MIN: CPT | Mod: S$PBB,,, | Performed by: UROLOGY

## 2025-02-25 PROCEDURE — 1160F RVW MEDS BY RX/DR IN RCRD: CPT | Mod: CPTII,,, | Performed by: UROLOGY

## 2025-02-25 RX ORDER — CEFDINIR 300 MG/1
300 CAPSULE ORAL 2 TIMES DAILY
Qty: 14 CAPSULE | Refills: 0 | Status: SHIPPED | OUTPATIENT
Start: 2025-02-25 | End: 2025-03-04

## 2025-02-25 NOTE — PROGRESS NOTES
Subjective:       Patient ID: Mae Hill Sr. is a 89 y.o. male.    Chief Complaint: No chief complaint on file.      HPI:  89-year-old male with high-risk prostate cancer managed on primary hormonal therapy.  Originally he was managed on antiandrogen monotherapy but due to breast tenderness he was switched to Lupron his PSA remains undetectable is here today for follow-up    Past Medical History:   Past Medical History:   Diagnosis Date    Anxiety     Balanitis     Chronic renal insufficiency, stage III (moderate)     COPD (chronic obstructive pulmonary disease)     Depression     Heart failure     L    High cholesterol     History of BPH     Hypertension     Prostate CA     Vascular dementia        Past Surgical Historical:   Past Surgical History:   Procedure Laterality Date    ABOVE-KNEE AMPUTATION Right 10/13/2021    with VAC    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    AMLODIPINE (NORVASC) 2.5 MG TABLET    Take 1 tablet by mouth once daily.    ASPIRIN (ECOTRIN) 81 MG EC TABLET    Take 81 mg by mouth once daily.    ATORVASTATIN (LIPITOR) 40 MG TABLET    TAKE 1 TABLET BY MOUTH ONCE DAILY AT BEDTIME    BICALUTAMIDE (CASODEX) 50 MG TAB    Take 1 tablet (50 mg total) by mouth once daily.    BLOOD SUGAR DIAGNOSTIC STRP    OneTouch Ultra Blue Test Strips   use TID as directed    CARVEDILOL (COREG) 12.5 MG TABLET    Take 12.5 mg by mouth 2 (two) times daily. as directed.    CARVEDILOL (COREG) 6.25 MG TABLET    Take 6.25 mg by mouth 2 (two) times daily.    CHOLECALCIFEROL, VITAMIN D3, (VITAMIN D3) 50 MCG (2,000 UNIT) CAP CAPSULE    Take 1 capsule every day by oral route.    CLOTRIMAZOLE (LOTRIMIN) 1 % CREAM     APPLY  CREAM TOPICALLY TO AFFECTED AREA TWICE DAILY. MIX 1:1 WITH HYDROCORTISONE CREAM AND APPLY TO AFFECTED AREA FOR 14 DAYS    CYANOCOBALAMIN, VITAMIN B-12, 1,000 MCG/ML DROP    5000mcg Q day    DONEPEZIL (ARICEPT) 10 MG TABLET    Take 10 mg by mouth every evening.    ELIQUIS 5 MG TAB    Take 5 mg by mouth 2 (two) times daily.    FINASTERIDE (PROSCAR) 5 MG TABLET    Take 5 mg by mouth once daily.    FUROSEMIDE (LASIX) 40 MG TABLET    Take 40 mg by mouth once daily.    LEVOTHYROXINE (SYNTHROID) 50 MCG TABLET    Take 50 mcg by mouth every morning. take on an empty stomach    LINAGLIPTIN (TRADJENTA) 5 MG TAB TABLET    Take 1 tablet by mouth once daily.    MEMANTINE (NAMENDA) 10 MG TAB    Take 10 mg by mouth 2 (two) times daily.    MONTELUKAST (SINGULAIR) 10 MG TABLET    Take 1 tablet every day by oral route for 14 days.    MUPIROCIN (BACTROBAN) 2 % OINTMENT    APPLY OINTMENT TOPICALLY TO AFFECTED AREA TWICE DAILY    NYSTATIN-TRIAMCINOLONE (MYCOLOG) OINTMENT    Apply topically 2 (two) times daily.    ONETOUCH ULTRASOFT LANCETS MISC    OneTouch UltraSoft Lancets   use as directed    OXYBUTYNIN (DITROPAN) 5 MG TAB    Take 1 tablet (5 mg total) by mouth 3 (three) times daily.    TAMSULOSIN (FLOMAX) 0.4 MG CAP    Take 0.4 mg by mouth once daily.    WARFARIN (COUMADIN) 5 MG TABLET    Take 5 mg by mouth.        Past Social History: Social History[1]    Allergies:   Review of patient's allergies indicates:   Allergen Reactions    Codeine Itching and Nausea And Vomiting     Other Reaction(s): vomiting        Family History:   Family History   Problem Relation Name Age of Onset    Heart disease Mother      Heart disease Father          Review of Systems:  Review of Systems   Constitutional:  Negative for activity change and appetite change.   HENT:  Negative for congestion and dental problem.    Eyes:  Negative for visual disturbance.   Respiratory:  Negative for chest tightness and shortness of breath.    Cardiovascular:   Negative for chest pain.   Gastrointestinal:  Negative for abdominal distention and abdominal pain.   Genitourinary:  Negative for decreased urine volume, difficulty urinating, dysuria, enuresis, flank pain, frequency, genital sores, hematuria, penile discharge, penile pain, penile swelling, scrotal swelling, testicular pain and urgency.   Musculoskeletal:  Negative for back pain and neck pain.   Skin:  Negative for color change.   Neurological:  Negative for dizziness.   Hematological:  Negative for adenopathy.   Psychiatric/Behavioral:  Negative for agitation, behavioral problems and confusion.        Physical Exam:  Physical Exam  Constitutional:       General: He is not in acute distress.     Appearance: He is well-developed.   HENT:      Head: Normocephalic and atraumatic.      Nose: Nose normal.   Eyes:      General: No scleral icterus.     Conjunctiva/sclera: Conjunctivae normal.      Pupils: Pupils are equal, round, and reactive to light.   Neck:      Thyroid: No thyromegaly.      Trachea: No tracheal deviation.   Cardiovascular:      Rate and Rhythm: Normal rate and regular rhythm.      Heart sounds: Normal heart sounds.   Pulmonary:      Effort: Pulmonary effort is normal. No respiratory distress.      Breath sounds: Normal breath sounds. No wheezing or rales.   Abdominal:      General: Bowel sounds are normal. There is no distension.      Palpations: Abdomen is soft.      Tenderness: There is no abdominal tenderness. There is no guarding or rebound.   Genitourinary:     Penis: Normal. No tenderness.       Prostate: Normal.   Musculoskeletal:         General: No deformity. Normal range of motion.      Cervical back: Neck supple.   Lymphadenopathy:      Cervical: No cervical adenopathy.   Skin:     General: Skin is warm and dry.      Findings: No erythema or rash.   Neurological:      Mental Status: He is alert and oriented to person, place, and time.      Cranial Nerves: No cranial nerve deficit.    Psychiatric:         Behavior: Behavior normal.         Assessment/Plan:       Problem List Items Addressed This Visit    None  Visit Diagnoses         Prostate cancer    -  Primary    Relevant Orders    Prostate Specific Antigen, Diagnostic      Pain in left testicle                   Prostate cancer PSA undetectable patient has been treated with primary hormonal therapy.  We will give Lupron today return to clinic in 6 months for the same    Patient also has left testicular pain consistent with epididymitis will call out [employer]       [1]   Social History  Socioeconomic History    Marital status:    Tobacco Use    Smoking status: Never    Smokeless tobacco: Never   Substance and Sexual Activity    Alcohol use: Not Currently    Drug use: Never     Social Drivers of Health     Stress: No Stress Concern Present (10/11/2019)    [employer] - Occupational Stress Questionnaire     Feeling of Stress : Not at all

## 2025-03-06 ENCOUNTER — TELEPHONE (OUTPATIENT)
Dept: UROLOGY | Facility: CLINIC | Age: OVER 89
End: 2025-03-06
Payer: MEDICARE

## 2025-03-06 NOTE — TELEPHONE ENCOUNTER
Contacted pt, c/o dark colored urine and unable to empty bladder, dysuria. Advised that lab has gone for the day, suggested proceeding to ER if he had not urinated. Pt declines, he states that he is not that bad off and would like to come in tomorrow. Scheduled, advised if at any point things worsens he should proceed to [facility]. Pt verbalized understanding. BJP    ----- Message from [employer] sent at 3/6/2025  2:48 PM CST -----  Contact: joey  Type:  Sooner Apoointment RequestCaller is requesting a sooner appointment.  Caller declined first available appointment listed below.  Caller will not accept being placed on the waitlist and is requesting a message be sent to doctor.Name of Caller:Julia is the first available appointment?05/2025Symptoms:dark colored urine/ unable to empty bladder ,painWould the patient rather a call back or a response via [facility]? [facility] Call Back Number:[phone] Information: n/a

## 2025-03-07 ENCOUNTER — TELEPHONE (OUTPATIENT)
Dept: UROLOGY | Facility: CLINIC | Age: OVER 89
End: 2025-03-07

## 2025-03-07 NOTE — TELEPHONE ENCOUNTER
Contacted pt, spoke with pt and spouse, she states that he's having a bad day with his memory (dementia) and went to wrong place. She suggested bringing him but he declined. Advised that it's too late, the lab has gone. They would like to reschedule. Advised pt if he experience any complications over the weekend to proceed to ER. SHER    ----- Message from [email or web address] sent at 3/7/2025  2:29 PM CST -----  Patient came for 1 pm nurse visit stated no one was there please call him back at [phone]

## 2025-04-01 ENCOUNTER — OUTSIDE PLACE OF SERVICE (OUTPATIENT)
Dept: PULMONOLOGY | Facility: CLINIC | Age: OVER 89
End: 2025-04-01
Payer: MEDICARE

## 2025-04-01 PROCEDURE — 99233 SBSQ HOSP IP/OBS HIGH 50: CPT | Mod: FS,,, | Performed by: INTERNAL MEDICINE

## 2025-04-02 ENCOUNTER — OUTSIDE PLACE OF SERVICE (OUTPATIENT)
Dept: PULMONOLOGY | Facility: CLINIC | Age: OVER 89
End: 2025-04-02
Payer: MEDICARE

## 2025-04-03 ENCOUNTER — TELEPHONE (OUTPATIENT)
Dept: UROLOGY | Facility: CLINIC | Age: OVER 89
End: 2025-04-03
Payer: MEDICARE

## 2025-04-03 NOTE — TELEPHONE ENCOUNTER
Spoke with daughter via telephone at same time she was on another call about patient condition.  Per conversation and daughter, Pt is in the dying process and being transferred from [facility] to be in room 11. Pt daughter states she has no idea what to do.  Explained to try to be at new facility when her father arrives so she can find out what his plan of care will be.  She states that her reason for calling was for a swollen testicle and was wanting antibiotics.  Let her know that he will most likely have just one doctor at the hospice facility but to call and let us know if she needs anything.----- Message from [employer] sent at 4/3/2025  2:54 PM CDT -----  Contact: Meagan  .Type:  Patient Requesting CallWho Called:Matheus Rhodes the patient know what this is regarding?: daughter states pt has been hospitalized for over a month, testical swollen, wants to update drWould the patient rather a call back or a response via [facility]? [facility] Call Back Number:[phone] Information:

## 2025-08-22 ENCOUNTER — TELEPHONE (OUTPATIENT)
Dept: UROLOGY | Facility: CLINIC | Age: OVER 89
End: 2025-08-22
Payer: MEDICARE